# Patient Record
Sex: FEMALE | Race: OTHER | NOT HISPANIC OR LATINO | Employment: PART TIME | URBAN - NONMETROPOLITAN AREA
[De-identification: names, ages, dates, MRNs, and addresses within clinical notes are randomized per-mention and may not be internally consistent; named-entity substitution may affect disease eponyms.]

---

## 2018-06-22 ENCOUNTER — OFFICE VISIT (OUTPATIENT)
Dept: URGENT CARE | Facility: PHYSICIAN GROUP | Age: 18
End: 2018-06-22

## 2018-06-22 VITALS
WEIGHT: 116 LBS | HEART RATE: 73 BPM | BODY MASS INDEX: 17.58 KG/M2 | HEIGHT: 68 IN | RESPIRATION RATE: 16 BRPM | OXYGEN SATURATION: 99 % | DIASTOLIC BLOOD PRESSURE: 82 MMHG | TEMPERATURE: 99.1 F | SYSTOLIC BLOOD PRESSURE: 122 MMHG

## 2018-06-22 DIAGNOSIS — R11.0 NAUSEA: ICD-10-CM

## 2018-06-22 DIAGNOSIS — R35.0 URINARY FREQUENCY: ICD-10-CM

## 2018-06-22 DIAGNOSIS — R42 DIZZY: Primary | ICD-10-CM

## 2018-06-22 LAB
APPEARANCE UR: CLEAR
BILIRUB UR STRIP-MCNC: NORMAL MG/DL
COLOR UR AUTO: YELLOW
GLUCOSE BLD-MCNC: 89 MG/DL (ref 70–100)
GLUCOSE UR STRIP.AUTO-MCNC: NORMAL MG/DL
INT CON NEG: NEGATIVE
INT CON POS: POSITIVE
KETONES UR STRIP.AUTO-MCNC: NORMAL MG/DL
LEUKOCYTE ESTERASE UR QL STRIP.AUTO: NORMAL
NITRITE UR QL STRIP.AUTO: NORMAL
PH UR STRIP.AUTO: 6 [PH] (ref 5–8)
POC URINE PREGNANCY TEST: NORMAL
PROT UR QL STRIP: NORMAL MG/DL
RBC UR QL AUTO: NORMAL
SP GR UR STRIP.AUTO: 1.01
UROBILINOGEN UR STRIP-MCNC: NORMAL MG/DL

## 2018-06-22 PROCEDURE — 82962 GLUCOSE BLOOD TEST: CPT | Performed by: PHYSICIAN ASSISTANT

## 2018-06-22 PROCEDURE — 81025 URINE PREGNANCY TEST: CPT | Performed by: PHYSICIAN ASSISTANT

## 2018-06-22 PROCEDURE — 99214 OFFICE O/P EST MOD 30 MIN: CPT | Performed by: PHYSICIAN ASSISTANT

## 2018-06-22 PROCEDURE — 81002 URINALYSIS NONAUTO W/O SCOPE: CPT | Performed by: PHYSICIAN ASSISTANT

## 2018-06-22 NOTE — LETTER
June 22, 2018         Patient: Melva Garcia   YOB: 2000   Date of Visit: 6/22/2018           To Whom it May Concern:    Melva Garcia was seen in my clinic on 6/22/2018. She may return to work on 6/25/2018.    If you have any questions or concerns, please don't hesitate to call.        Sincerely,           Brittni Siu P.A.-C.  Electronically Signed

## 2018-06-23 ASSESSMENT — ENCOUNTER SYMPTOMS
VOMITING: 0
FOCAL WEAKNESS: 0
FEVER: 0
HEADACHES: 1
SENSORY CHANGE: 0
ABDOMINAL PAIN: 0
TINGLING: 0
VERTIGO: 0
DIZZINESS: 1
NAUSEA: 1

## 2018-06-24 NOTE — PROGRESS NOTES
"Subjective:      Melva Garcia is a 17 y.o. female who presents with Nausea (nausea, headache, disoriented x1week dizziness 2days )    PMH: Reviewed with patient/family member/EPIC.   MEDS:   Current Outpatient Prescriptions:   •  fluconazole (DIFLUCAN) 150 MG tablet, Take 1 Tab by mouth See Admin Instructions. One By mouth day 1, then repeat in 72 hours, Disp: 2 Tab, Rfl: 0  •  phenazopyridine (PYRIDIUM) 200 MG Tab, Take 1 Tab by mouth 3 times a day as needed., Disp: 6 Tab, Rfl: 0  •  Acetaminophen (TYLENOL CHILDRENS PO), Take  by mouth as needed., Disp: , Rfl:   ALLERGIES: No Known Allergies  SURGHX: History reviewed. No pertinent surgical history.  SOCHX:  reports that she has never smoked. She has never used smokeless tobacco. She reports that she uses drugs, including Marijuana. She reports that she does not drink alcohol.  FH: Reviewed with patient/family. Not pertinent to this complaint.          Patient presents with:  Nausea: nausea, headache, disoriented x1week dizziness 2days           Nausea   This is a new problem. The current episode started in the past 7 days. The problem occurs constantly. The problem has been waxing and waning. Associated symptoms include headaches and nausea. Pertinent negatives include no abdominal pain, fever, vertigo or vomiting. The symptoms are aggravated by exertion, standing, walking and bending. She has tried drinking, eating, lying down, position changes and rest for the symptoms. The treatment provided no relief.       Review of Systems   Constitutional: Positive for malaise/fatigue. Negative for fever.   Gastrointestinal: Positive for nausea. Negative for abdominal pain and vomiting.   Neurological: Positive for dizziness and headaches. Negative for vertigo, tingling, sensory change and focal weakness.   All other systems reviewed and are negative.         Objective:     /82   Pulse 73   Temp 37.3 °C (99.1 °F)   Resp 16   Ht 1.727 m (5' 8\")   Wt 52.6 kg (116 " lb)   SpO2 99%   BMI 17.64 kg/m²      Physical Exam   Constitutional: She is oriented to person, place, and time. She appears well-developed and well-nourished. She does not appear ill. No distress.   HENT:   Head: Normocephalic and atraumatic.   Right Ear: Tympanic membrane normal.   Left Ear: Tympanic membrane normal.   Nose: Nose normal.   Mouth/Throat: Uvula is midline and oropharynx is clear and moist.   Eyes: Conjunctivae and EOM are normal. Pupils are equal, round, and reactive to light.   Neck: Normal range of motion. Neck supple.   Cardiovascular: Normal rate, regular rhythm and normal heart sounds.    Pulmonary/Chest: Effort normal and breath sounds normal.   Abdominal: Soft.   Musculoskeletal: Normal range of motion.   Neurological: She is alert and oriented to person, place, and time. Gait normal.   Skin: Skin is warm and dry. Capillary refill takes less than 2 seconds.   Psychiatric: She has a normal mood and affect.   Nursing note and vitals reviewed.         UA: neg dip  Preg: neg  Glucose: 89     Assessment/Plan:     1. Dizzy  POCT Urinalysis    POCT Pregnancy    POCT Glucose   2. Nausea  POCT Urinalysis    POCT Pregnancy    POCT Glucose   3. Urinary frequency  POCT Urinalysis    POCT Pregnancy    POCT Glucose     PT requires evaluation and treatment at a facility that can provide a higher level of care due to acuity of illness/complaint.     PT friend will drive her to the ER for a more in depth work up than can be accomplished in this uc.

## 2019-04-13 ENCOUNTER — HOSPITAL ENCOUNTER (OUTPATIENT)
Facility: MEDICAL CENTER | Age: 19
End: 2019-04-13
Attending: PHYSICIAN ASSISTANT
Payer: COMMERCIAL

## 2019-04-13 ENCOUNTER — OFFICE VISIT (OUTPATIENT)
Dept: URGENT CARE | Facility: PHYSICIAN GROUP | Age: 19
End: 2019-04-13
Payer: COMMERCIAL

## 2019-04-13 VITALS
RESPIRATION RATE: 14 BRPM | SYSTOLIC BLOOD PRESSURE: 120 MMHG | BODY MASS INDEX: 17.58 KG/M2 | WEIGHT: 116 LBS | HEART RATE: 104 BPM | OXYGEN SATURATION: 99 % | DIASTOLIC BLOOD PRESSURE: 84 MMHG | TEMPERATURE: 98 F | HEIGHT: 68 IN

## 2019-04-13 DIAGNOSIS — R31.9 HEMATURIA, UNSPECIFIED TYPE: ICD-10-CM

## 2019-04-13 DIAGNOSIS — N12 PYELONEPHRITIS: ICD-10-CM

## 2019-04-13 LAB
APPEARANCE UR: NORMAL
BILIRUB UR STRIP-MCNC: NORMAL MG/DL
COLOR UR AUTO: NORMAL
GLUCOSE UR STRIP.AUTO-MCNC: NORMAL MG/DL
KETONES UR STRIP.AUTO-MCNC: 15 MG/DL
LEUKOCYTE ESTERASE UR QL STRIP.AUTO: NORMAL
NITRITE UR QL STRIP.AUTO: POSITIVE
PH UR STRIP.AUTO: 7 [PH] (ref 5–8)
PROT UR QL STRIP: 300 MG/DL
RBC UR QL AUTO: NORMAL
SP GR UR STRIP.AUTO: 1.02
UROBILINOGEN UR STRIP-MCNC: 1 MG/DL

## 2019-04-13 PROCEDURE — 87186 SC STD MICRODIL/AGAR DIL: CPT

## 2019-04-13 PROCEDURE — 99214 OFFICE O/P EST MOD 30 MIN: CPT | Performed by: PHYSICIAN ASSISTANT

## 2019-04-13 PROCEDURE — 87077 CULTURE AEROBIC IDENTIFY: CPT

## 2019-04-13 PROCEDURE — 87086 URINE CULTURE/COLONY COUNT: CPT

## 2019-04-13 PROCEDURE — 81002 URINALYSIS NONAUTO W/O SCOPE: CPT | Performed by: PHYSICIAN ASSISTANT

## 2019-04-13 RX ORDER — CIPROFLOXACIN 500 MG/1
500 TABLET, FILM COATED ORAL EVERY 12 HOURS
Qty: 14 TAB | Refills: 0 | Status: SHIPPED | OUTPATIENT
Start: 2019-04-13 | End: 2019-04-20

## 2019-04-13 NOTE — PROGRESS NOTES
Chief Complaint   Patient presents with   • Blood in Urine   • Nausea       HISTORY OF PRESENT ILLNESS: Patient is a 18 y.o. female who presents today because she has a 2-3-day history of blood in her urine, lower abdominal discomfort, nausea, mild bilateral flank pain.  Denies any fevers, vomiting or diarrhea.  She has not been taking any medications for symptoms, has only minimal if any pain with urination    There are no active problems to display for this patient.      Allergies:Pcn [penicillins]    Current Outpatient Prescriptions Ordered in [facility]   Medication Sig Dispense Refill   • ciprofloxacin (CIPRO) 500 MG Tab Take 1 Tab by mouth every 12 hours for 7 days. 14 Tab 0   • fluconazole (DIFLUCAN) 150 MG tablet Take 1 Tab by mouth See Admin Instructions. One By mouth day 1, then repeat in 72 hours (Patient not taking: Reported on 4/13/2019) 2 Tab 0   • phenazopyridine (PYRIDIUM) 200 MG Tab Take 1 Tab by mouth 3 times a day as needed. (Patient not taking: Reported on 4/13/2019) 6 Tab 0   • Acetaminophen (TYLENOL CHILDRENS PO) Take  by mouth as needed.       No current Epic-ordered facility-administered medications on file.        No past medical history on file.    Social History   Substance Use Topics   • Smoking status: Never Smoker   • Smokeless tobacco: Never Used   • Alcohol use No       No family status information on file.   No family history on file.    ROS:  Review of Systems   Constitutional: Negative for fever, chills, weight loss and malaise/fatigue.   HENT: Negative for ear pain, nosebleeds, congestion, sore throat and neck pain.    Eyes: Negative for blurred vision.   Respiratory: Negative for cough, sputum production, shortness of breath and wheezing.    Cardiovascular: Negative for chest pain, palpitations, orthopnea and leg swelling.   Gastrointestinal: Negative for heartburn, positive for nausea, no vomiting and positive for bilateral flank and mild lower abdominal pain.   Genitourinary:  "Positive for mild dysuria, urgency and frequency.     Exam:  /84 (BP Location: Left arm, Patient Position: Sitting, BP Cuff Size: Small adult)   Pulse (!) 104   Temp 36.7 °C (98 °F) (Temporal)   Resp 14   Ht 1.727 m (5' 8\")   Wt 52.6 kg (116 lb)   SpO2 99%   General:  Well nourished, well developed female in NAD  Head:Normocephalic, atraumatic  Eyes: PERRLA, EOM within normal limits, no conjunctival injection, no scleral icterus, visual fields and acuity grossly intact.  Extremities: no clubbing, cyanosis, or edema.    Urinalysis in the office shows cloudy bloody urine with moderate amount of bilirubin, ketones, large amount of blood, protein, nitrites and leukocyte esterase.    Please note that this dictation was created using voice recognition software. I have made every reasonable attempt to correct obvious errors, but I expect that there are errors of grammar and possibly content that I did not discover before finalizing the note.    Assessment/Plan:  1. Hematuria, unspecified type  POCT Urinalysis   2. Pyelonephritis  Urine Culture    ciprofloxacin (CIPRO) 500 MG Tab   Increase p.o. fluids.    Followup with primary care in the next 7-10 days if not significantly improving, return to the urgent care or go to the emergency room sooner for any worsening of symptoms.       "

## 2019-04-15 DIAGNOSIS — N12 PYELONEPHRITIS: ICD-10-CM

## 2019-04-17 LAB
BACTERIA UR CULT: ABNORMAL
BACTERIA UR CULT: ABNORMAL
SIGNIFICANT IND 70042: ABNORMAL
SITE SITE: ABNORMAL
SOURCE SOURCE: ABNORMAL

## 2019-04-19 ENCOUNTER — TELEPHONE (OUTPATIENT)
Dept: MEDICAL GROUP | Facility: PHYSICIAN GROUP | Age: 19
End: 2019-04-19

## 2019-04-19 NOTE — TELEPHONE ENCOUNTER
----- Message from Jhoan Lund P.A.-C. sent at 4/17/2019  9:43 AM PDT -----  Please notify the patient that the urine culture was positive for bacterial infection.  It is susceptible to the medication I prescribed.  Finish the antibiotic and follow up with PCP if symptoms persist.

## 2020-07-23 ENCOUNTER — OFFICE VISIT (OUTPATIENT)
Dept: URGENT CARE | Facility: CLINIC | Age: 20
End: 2020-07-23
Payer: COMMERCIAL

## 2020-07-23 ENCOUNTER — HOSPITAL ENCOUNTER (OUTPATIENT)
Facility: MEDICAL CENTER | Age: 20
End: 2020-07-23
Attending: PHYSICIAN ASSISTANT
Payer: COMMERCIAL

## 2020-07-23 VITALS
HEIGHT: 68 IN | RESPIRATION RATE: 18 BRPM | HEART RATE: 63 BPM | TEMPERATURE: 98 F | DIASTOLIC BLOOD PRESSURE: 70 MMHG | SYSTOLIC BLOOD PRESSURE: 114 MMHG | OXYGEN SATURATION: 93 % | BODY MASS INDEX: 18.64 KG/M2 | WEIGHT: 123 LBS

## 2020-07-23 DIAGNOSIS — N90.89 LABIAL LESION: ICD-10-CM

## 2020-07-23 DIAGNOSIS — Z11.3 SCREENING FOR STD (SEXUALLY TRANSMITTED DISEASE): ICD-10-CM

## 2020-07-23 PROCEDURE — 87491 CHLMYD TRACH DNA AMP PROBE: CPT

## 2020-07-23 PROCEDURE — 87529 HSV DNA AMP PROBE: CPT

## 2020-07-23 PROCEDURE — 99214 OFFICE O/P EST MOD 30 MIN: CPT | Performed by: PHYSICIAN ASSISTANT

## 2020-07-23 PROCEDURE — 87591 N.GONORRHOEAE DNA AMP PROB: CPT

## 2020-07-23 RX ORDER — SULFAMETHOXAZOLE AND TRIMETHOPRIM 800; 160 MG/1; MG/1
1 TABLET ORAL 2 TIMES DAILY
Qty: 14 TAB | Refills: 0 | Status: SHIPPED | OUTPATIENT
Start: 2020-07-23 | End: 2020-07-30

## 2020-07-23 ASSESSMENT — ENCOUNTER SYMPTOMS
HEADACHES: 0
VAGINITIS: 1
EYE DISCHARGE: 0
NAUSEA: 0
VOMITING: 0
SHORTNESS OF BREATH: 0
ABDOMINAL PAIN: 0
FEVER: 0
EYE REDNESS: 0
SORE THROAT: 0
COUGH: 0

## 2020-07-23 NOTE — PROGRESS NOTES
Subjective:      Melva Garcia is a 20 y.o. female who presents with Vaginal Pain (SORE ON LABIA X 5 DAYS , VERY PAINFUL AND UNCOMFORTABLE )        Vaginitis   The patient's primary symptoms include genital lesions. This is a new problem. Episode onset: x 5 days ago. The problem occurs constantly. The problem has been unchanged. The pain is moderate. The problem affects the right side. Pertinent negatives include no abdominal pain, dysuria, fever, frequency, headaches, hematuria, joint pain, nausea, rash, sore throat, urgency or vomiting. The vaginal discharge was normal. There has been no bleeding. The symptoms are aggravated by tactile pressure (and walking). Treatments tried: The patient has applied warm compresses to the affected area.  She has also applied Neosporin to the lesion. The treatment provided mild relief.     The patient presents to clinic complaining of a bump to her right labia x5 days.  The patient describes the bump as painful.  She reports associated irritation with palpation and walkikng.  The patient states she has been trying home remedies, which includes warm compresses and Neosporin.  The patient states the bump to her right labia has gradually decreased in size.  The patient notes possible discharge/drainage from the bump.  The patient reports no associated fever.  No abdominal pain.  No pelvic pain.  No abnormal vaginal discharge.  No abnormal vaginal bleeding.  No urinary symptoms.  No urinary frequency.  No urinary urgency.  No hematuria.  The patient has taken ibuprofen for her current symptoms.  The patient states she is not concerned about sexually transmitted diseases at this time, and states her partner has no similar symptoms.    The patient's last menstrual period was 7/1/2020.    PMH:  has no past medical history on file.  MEDS:   Current Outpatient Medications:   •  fluconazole (DIFLUCAN) 150 MG tablet, Take 1 Tab by mouth See Admin Instructions. One By mouth day 1, then repeat  "in 72 hours (Patient not taking: Reported on 4/13/2019), Disp: 2 Tab, Rfl: 0  •  phenazopyridine (PYRIDIUM) 200 MG Tab, Take 1 Tab by mouth 3 times a day as needed. (Patient not taking: Reported on 4/13/2019), Disp: 6 Tab, Rfl: 0  •  Acetaminophen (TYLENOL CHILDRENS PO), Take  by mouth as needed., Disp: , Rfl:   ALLERGIES:   Allergies   Allergen Reactions   • Pcn [Penicillins]      SURGHX: History reviewed. No pertinent surgical history.  SOCHX:  reports that she has never smoked. She has never used smokeless tobacco. She reports current drug use. Drug: Marijuana. She reports that she does not drink alcohol.  FH: Family history was reviewed, no pertinent findings to report    Review of Systems   Constitutional: Negative for fever.   HENT: Negative for congestion, ear pain and sore throat.    Eyes: Negative for discharge and redness.   Respiratory: Negative for cough and shortness of breath.    Cardiovascular: Negative for chest pain and leg swelling.   Gastrointestinal: Negative for abdominal pain, nausea and vomiting.   Genitourinary: Negative for dysuria, frequency, hematuria and urgency.   Musculoskeletal: Negative for joint pain.   Skin: Negative for rash.   Neurological: Negative for headaches.          Objective:     /70 (BP Location: Left arm, Patient Position: Sitting, BP Cuff Size: Adult)   Pulse 63   Temp 36.7 °C (98 °F) (Temporal)   Resp 18   Ht 1.727 m (5' 8\")   Wt 55.8 kg (123 lb)   SpO2 93%   BMI 18.70 kg/m²      Physical Exam  Constitutional:       General: She is not in acute distress.     Appearance: Normal appearance. She is not ill-appearing.   HENT:      Head: Normocephalic.      Right Ear: External ear normal.      Left Ear: External ear normal.      Nose: Nose normal.   Eyes:      Extraocular Movements: Extraocular movements intact.      Conjunctiva/sclera: Conjunctivae normal.   Neck:      Musculoskeletal: Normal range of motion and neck supple.   Cardiovascular:      Rate and " Rhythm: Normal rate.   Pulmonary:      Effort: Pulmonary effort is normal.   Genitourinary:     Labia:         Right: Lesion present.           Comments:   Right Labia:  Open ulcer-like lesion to the right labia with slight tenderness to palpation.  No swelling.  No surrounding erythema.  No increased warmth.  No active discharge/drainage.  No vesicles.  No pustules.  No palpable induration.  No palpable fluctuance.  No additional labial lesions.   Musculoskeletal: Normal range of motion.   Skin:     General: Skin is warm and dry.   Neurological:      Mental Status: She is alert and oriented to person, place, and time.            Progress:  HSV PCR - pending     Syphilis - pending    Chlamydia/Gonorrhea - pending      Assessment/Plan:     1. Labial lesion  - T.PALLIDUM AB EIA; Future  - HSV 1/2 By PCR(Herpes)+[id number]; Future  - sulfamethoxazole-trimethoprim (BACTRIM DS) 800-160 MG tablet; Take 1 Tab by mouth 2 times a day for 7 days.  Dispense: 14 Tab; Refill: 0    2. Screening for STD (sexually transmitted disease)  - CHLAMYDIA/GC PCR URINE OR SWAB; Future    The patient's presenting symptoms and physical exam findings are consistent with a labial lesion to the right labia.  On physical exam, the patient had an open ulcer-like lesion to the right labia with slight tenderness to palpation.  No swelling, surrounding erythema, increased warmth, active discharge/drainage, vesicles, or pustules were appreciated.  No palpable induration or palpable fluctuance was noted.  No additional labial lesions were present.  Based on the patient's presenting symptoms and physical exam findings, I believe the patient's symptoms are due to a localized labial abscess, which spontaneously drained.  Will prescribe the patient Bactrim for her current symptoms.  Advised the patient to continue warm compresses for symptomatic management.  Additionally, will test the patient for HSV and Syphilis to rule out possible secondary causes of her  current symptoms.  Based on the patient's physical exam findings, it is unlikely her symptoms are due to herpes and/or syphilis.  The patient states she would also like to be tested for chlamydia and gonorrhea at this time given she is being tested for herpes and syphilis.  Will call the patient with the results of her STD testing, and will treat accordingly.  Recommend OTC medications and supportive care for symptomatic management.  Recommend patient follow-up with her PCP and/or OB/GYN.  Discussed return precautions with the patient, and she verbalized understanding.    Differential diagnoses, supportive care, and indications for immediate follow-up discussed with patient.   Instructed to return to clinic or nearest emergency department for any change in condition, further concerns, or worsening of symptoms.    OTC Tylenol or Motrin for fever/discomfort.  Apply warm compresses to the affected area for symptomatic relief  Warm sits baths  Monitor for worsening signs of infection  Follow-up with PCP and/or OB/GYN   Work note provided  Return to clinic or go to the ED if symptoms worsen or fail to improve, or if patient should develop worsening/increasing/persistent labial lesion(s), pain/tenderness to the affected area, swelling, increased redness or warmth, discharge/drainage, urinary symptoms, abnormal vaginal discharge, abnormal vaginal bleeding, abdominal pain, pelvic pain, fever/chills, secondary signs of infection, and/or any concerning symptoms.    Discussed plan with the patient, and she agrees to the above.     Please note that this dictation was created using voice recognition software. I have made every reasonable attempt to correct obvious errors, but I expect that there may be errors of grammar and possibly content that I did not discover before finalizing the note.

## 2020-07-23 NOTE — LETTER
July 23, 2020         Patient: Melva Garcia   YOB: 2000   Date of Visit: 7/23/2020           To Whom it May Concern:    Melva Garcia was seen in my clinic on 7/23/2020. She may return to work on Sunday July 26, 2020.    If you have any questions or concerns, please don't hesitate to call.        Sincerely,           Vanessa Oliveira P.A.-C.  Electronically Signed

## 2020-07-24 LAB
C TRACH DNA SPEC QL NAA+PROBE: NEGATIVE
HSV1 DNA SPEC QL NAA+PROBE: NEGATIVE
HSV2 DNA SPEC QL NAA+PROBE: POSITIVE
N GONORRHOEA DNA SPEC QL NAA+PROBE: NEGATIVE
SPECIMEN SOURCE: ABNORMAL
SPECIMEN SOURCE: NORMAL

## 2020-07-25 ENCOUNTER — TELEPHONE (OUTPATIENT)
Dept: URGENT CARE | Facility: PHYSICIAN GROUP | Age: 20
End: 2020-07-25

## 2020-07-25 NOTE — TELEPHONE ENCOUNTER
7/25 @ 10:44AM    Attempted to call the patient with her test results. The patient's chlamydia and gonorrhea testing was negative. The patient's herpes PCR swab was positive for HSV 2. Advised the patient to return my call at [phone]. Will also try to call the patient again later.

## 2020-07-27 NOTE — TELEPHONE ENCOUNTER
7/27 @ 2:01PM    Spoke the patient regarding her STD testing. Informed the patient her chlamydia and gonorrhea testing was NEGATIVE. Advised the patient the Herpes PCR swab was POSITIVE for HSV Type 2. Given the duration since the patient's initial onset of symptoms, antiviral medications are not indicated at this time. The patient states she had not had any additional outbreaks. Advised the patient to follow-up with Primary Care and/or Gynecology. Recommend the patient return to clinic for any worsening or concerning symptoms. The patient had no further questions at this time.

## 2020-07-30 ENCOUNTER — TELEPHONE (OUTPATIENT)
Dept: SCHEDULING | Facility: IMAGING CENTER | Age: 20
End: 2020-07-30

## 2020-07-31 ENCOUNTER — OFFICE VISIT (OUTPATIENT)
Dept: MEDICAL GROUP | Facility: MEDICAL CENTER | Age: 20
End: 2020-07-31
Payer: COMMERCIAL

## 2020-07-31 VITALS
HEIGHT: 68 IN | RESPIRATION RATE: 16 BRPM | SYSTOLIC BLOOD PRESSURE: 114 MMHG | TEMPERATURE: 98.9 F | DIASTOLIC BLOOD PRESSURE: 74 MMHG | OXYGEN SATURATION: 98 % | BODY MASS INDEX: 18.41 KG/M2 | WEIGHT: 121.47 LBS | HEART RATE: 78 BPM

## 2020-07-31 DIAGNOSIS — A60.00 GENITAL HERPES SIMPLEX, UNSPECIFIED SITE: ICD-10-CM

## 2020-07-31 DIAGNOSIS — Z13.1 DIABETES MELLITUS SCREENING: ICD-10-CM

## 2020-07-31 DIAGNOSIS — N76.0 LABIAL INFECTION: ICD-10-CM

## 2020-07-31 DIAGNOSIS — Z11.3 SCREEN FOR STD (SEXUALLY TRANSMITTED DISEASE): ICD-10-CM

## 2020-07-31 DIAGNOSIS — Z00.00 ENCOUNTER FOR PREVENTIVE CARE: ICD-10-CM

## 2020-07-31 DIAGNOSIS — Z13.6 SCREENING FOR CARDIOVASCULAR CONDITION: ICD-10-CM

## 2020-07-31 PROCEDURE — 99204 OFFICE O/P NEW MOD 45 MIN: CPT | Performed by: INTERNAL MEDICINE

## 2020-07-31 RX ORDER — VALACYCLOVIR HYDROCHLORIDE 1 G/1
1000 TABLET, FILM COATED ORAL 2 TIMES DAILY
Qty: 14 TAB | Refills: 0 | Status: SHIPPED | OUTPATIENT
Start: 2020-07-31 | End: 2020-08-07

## 2020-07-31 RX ORDER — SULFAMETHOXAZOLE AND TRIMETHOPRIM 800; 160 MG/1; MG/1
1 TABLET ORAL 2 TIMES DAILY
Qty: 6 TAB | Refills: 0 | Status: SHIPPED | OUTPATIENT
Start: 2020-07-31 | End: 2020-08-03

## 2020-07-31 ASSESSMENT — ANXIETY QUESTIONNAIRES
4. TROUBLE RELAXING: SEVERAL DAYS
6. BECOMING EASILY ANNOYED OR IRRITABLE: NOT AT ALL
GAD7 TOTAL SCORE: 4
7. FEELING AFRAID AS IF SOMETHING AWFUL MIGHT HAPPEN: SEVERAL DAYS
2. NOT BEING ABLE TO STOP OR CONTROL WORRYING: NOT AT ALL
1. FEELING NERVOUS, ANXIOUS, OR ON EDGE: SEVERAL DAYS
5. BEING SO RESTLESS THAT IT IS HARD TO SIT STILL: NOT AT ALL
3. WORRYING TOO MUCH ABOUT DIFFERENT THINGS: SEVERAL DAYS

## 2020-07-31 ASSESSMENT — PATIENT HEALTH QUESTIONNAIRE - PHQ9
5. POOR APPETITE OR OVEREATING: 1 - SEVERAL DAYS
SUM OF ALL RESPONSES TO PHQ QUESTIONS 1-9: 7
CLINICAL INTERPRETATION OF PHQ2 SCORE: 2

## 2020-07-31 NOTE — LETTER
[facility]  [address]  [facility] [address]     July 31, 2020    Patient: Melva Garcia   YOB: 2000   Date of Visit: 7/31/2020       To Whom It May Concern:    Melva Garcia was seen and treated in our department on 7/31/2020. Please excuse her from work from 7/29/2020 to 8/1/2020 due to her medical conditions. Please allow her to return to work with no restriction on 8/6/2020.      Sincerely,       Teresita Erickson M.D.   [phone]

## 2020-07-31 NOTE — PROGRESS NOTES
"New Patient to Establish    Reason to establish: New patient to establish    Melva Garcia is a 20 y.o. female who presents today with the following:    CC:   Chief Complaint   Patient presents with   • Establish Care   • Medication Refill     for HSV-2       HPI:     Genital herpes simplex  Labial painful blister     Ref. Range 7/23/2020 12:08   HSV Type 2 Latest Ref Range: Negative  POSITIVE (A)   HSV Type I Latest Ref Range: Negative  Negative   Trial of valacyclovir for 7 days      Labial infection  Right labial infection  Improving with bactrim DS, continue for 3 more days          Current Outpatient Medications:   •  valacyclovir (VALTREX) 1 GM Tab, Take 1 Tab by mouth 2 times a day for 7 days., Disp: 14 Tab, Rfl: 0  •  sulfamethoxazole-trimethoprim (BACTRIM DS) 800-160 MG tablet, Take 1 Tab by mouth 2 times a day for 3 days., Disp: 6 Tab, Rfl: 0  •  Acetaminophen (TYLENOL CHILDRENS PO), Take  by mouth as needed., Disp: , Rfl:     Allergies, past medical history, past surgical history, medications, family history, social history reviewed and updated.    ROS     Constitutional: Denies fevers or chills  Eyes: Denies changes in vision  Ears/Nose/Throat/Mouth: Denies nasal congestion or sore throat   Cardiovascular: Denies chest pain or palpitations   Respiratory: Denies shortness of breath , Denies cough  Gastrointestinal/Hepatic: Denies abd pain, nausea, vomiting   Genitourinary: Denies dysuria or frequency  Musculoskeletal/Rheum: Denies joint pain and swelling   Neurological: Denies headache  Psychiatric: Denies mood disorder   Endocrine: Denies hx of diabetes or thyroid dysfunction  Heme/Oncology/Lymph Nodes: Denies weight changes or enlarged LNs.    Physical Exam  /74 (BP Location: Left arm, Patient Position: Sitting, BP Cuff Size: Adult)   Pulse 78   Temp 37.2 °C (98.9 °F) (Temporal)   Resp 16   Ht 1.727 m (5' 8\")   Wt 55.1 kg (121 lb 7.6 oz)   SpO2 98%   BMI 18.47 kg/m²   General: Normal " appearance.  Well developed, well nourished, no acute distress.  HEENT: Normocephalic.  Extraocular motion intact. Pupils are equally round, reactive to light and accommodation, conjunctiva clear, no scleral icterus.  Ears: normal shape and contour, ear canals clear, tympanic membranes intact. Hearing intact.  Oropharynx clear, no erythema, edema or exudate noted.  NECK: Thyroid is not enlarged. No JVD.  No carotid bruits. No masses.  Cardiovascular: Regular rhythm and rate. No murmur/rubs/gallops.   Respiratory: Normal respiratory effort, clear to auscultation bilaterally. No wheezing/rales/rhonchi.    Abdomen: Bowel sounds present, soft, nontender, nondistended, no rebound, no guarding. No hepatosplenomegaly.  : No suprapubic tenderness. No CVA tenderness.   EXT: no LE edema b/l. No cyanosis.  No clubbing.  Lymph: No cervical, supraclavicular or axillary lymph nodes are palpable  Skin: Warm and dry.  No suspicious lesions or rashes.   Neurologic: No focal deficits.  Cranial nerves II through XII grossly intact.  Sensation intact.  Deep tendon reflexes 2+  Psych: AAOx3,  Normal mood and affect, normal judgment and insight, memory within normal limits  : no suprapubic pain, tenderness of right labial region. No regional LN      Assessment and Plan    1. Genital herpes simplex, unspecified site  - valacyclovir (VALTREX) 1 GM Tab; Take 1 Tab by mouth 2 times a day for 7 days.  Dispense: 14 Tab; Refill: 0    2. Labial infection  - sulfamethoxazole-trimethoprim (BACTRIM DS) 800-160 MG tablet; Take 1 Tab by mouth 2 times a day for 3 days.  Dispense: 6 Tab; Refill: 0    3. Encounter for preventive care  - Lipid Profile; Future  - CBC WITH DIFFERENTIAL; Future  - Comp Metabolic Panel; Future  - TSH WITH REFLEX TO FT4; Future    4. Diabetes mellitus screening  - Comp Metabolic Panel; Future    5. Screening for cardiovascular condition  - Lipid Profile; Future    6. Screen for STD (sexually transmitted disease)  -  T.PALLIDUM AB EIA; Future  - HIV AG/AB COMBO ASSAY SCREENING; Future  - HEP C VIRUS ANTIBODY; Future  - HEP B SURFACE ANTIGEN; Future  - HEP B SURFACE AB; Future  - Chlamydia/GC PCR Urine Or Swab; Future        Follow-up:Return if symptoms worsen or fail to improve.    This note was created using voice recognition software. There may be unintended errors in spelling, grammar or content.

## 2020-07-31 NOTE — ASSESSMENT & PLAN NOTE
Labial painful blister     Ref. Range 7/23/2020 12:08   HSV Type 2 Latest Ref Range: Negative  POSITIVE (A)   HSV Type I Latest Ref Range: Negative  Negative   Trial of valacyclovir for 7 days

## 2020-09-15 ENCOUNTER — OFFICE VISIT (OUTPATIENT)
Dept: MEDICAL GROUP | Facility: MEDICAL CENTER | Age: 20
End: 2020-09-15
Payer: COMMERCIAL

## 2020-09-15 VITALS
HEART RATE: 81 BPM | RESPIRATION RATE: 16 BRPM | OXYGEN SATURATION: 100 % | TEMPERATURE: 98.6 F | WEIGHT: 126.76 LBS | DIASTOLIC BLOOD PRESSURE: 56 MMHG | BODY MASS INDEX: 19.21 KG/M2 | SYSTOLIC BLOOD PRESSURE: 106 MMHG | HEIGHT: 68 IN

## 2020-09-15 DIAGNOSIS — A60.00 GENITAL HERPES SIMPLEX, UNSPECIFIED SITE: ICD-10-CM

## 2020-09-15 DIAGNOSIS — N76.0 LABIAL INFECTION: ICD-10-CM

## 2020-09-15 PROCEDURE — 99214 OFFICE O/P EST MOD 30 MIN: CPT | Performed by: INTERNAL MEDICINE

## 2020-09-15 NOTE — LETTER
[facility]  [address]  [facility] [address]     September 15, 2020    Patient: Melva Garcia   YOB: 2000   Date of Visit: 9/15/2020       To Whom It May Concern:    Melva Garcia was seen and treated in our department on 9/15/2020. Patient is okay to return to work on 8/26/2020 with no restrictions.       Sincerely,       Teresita Erickson M.D.

## 2020-09-16 NOTE — PROGRESS NOTES
"Established Patient    Melva Garcia is a 20 y.o. female who presents today with the following:    CC:   Chief Complaint   Patient presents with   • Paperwork       HPI: She is here do do the [facility] paperwork.  Her labial infection and genital herpes simplex has resolved with valtrex and Bactrim DS.  She is following with OBGYN Dr. Josette Ortega    Current Outpatient Medications   Medication Sig Dispense Refill   • Acetaminophen (TYLENOL CHILDRENS PO) Take  by mouth as needed.       No current facility-administered medications for this visit.        Allergies, past medical history, past surgical history, medications, family history, social history reviewed and updated.    ROS   Constitutional: Denies fevers or chills  Eyes: Denies changes in vision  Ears/Nose/Throat/Mouth: Denies nasal congestion or sore throat   Cardiovascular: Denies chest pain or palpitations   Respiratory: Denies shortness of breath , Denies cough  Gastrointestinal/Hepatic: Denies abd pain, nausea, vomiting   Genitourinary: Denies dysuria or frequency  Musculoskeletal/Rheum: Denies joint pain and swelling   Neurological: Denies headache  Psychiatric: Denies mood disorder   Endocrine: Denies hx of diabetes or thyroid dysfunction  Heme/Oncology/Lymph Nodes: Denies weight changes or enlarged LNs.    Physical Exam  Vitals: /56 (BP Location: Left arm, Patient Position: Sitting, BP Cuff Size: Adult)   Pulse 81   Temp 37 °C (98.6 °F) (Temporal)   Resp 16   Ht 1.727 m (5' 8\")   Wt 57.5 kg (126 lb 12.2 oz)   SpO2 100%   BMI 19.27 kg/m²   General: Alert, pleasant, NAD  HEENT: Normocephalic.  EOMI, no icterus or pallor.  Conjunctivae and lids normal. External ears normal. Oropharynx non-erythematous, mucous membranes moist.  Neck supple.  No thyromegaly or masses palpated.   Lymph: No cervical or supraclavicular lymphadenopathy.  Cardiovascular: Regular rate and rhythm.  S1 and S2 normal.  No murmurs appreciated.  Respiratory: Normal respiratory " effort.  Clear to auscultation bilaterally.  Abdomen: Non-distended, soft  Skin: Warm, dry, no rashes.  Musculoskeletal: Gait is normal.  Moves all extremities well.  Extremities: No leg edema.     Psych:  Affect/mood is normal, judgement is good, memory is intact, grooming is appropriate.        Assessment and Plan    1. Genital herpes simplex, unspecified site  In remission  Following with OBGYN    2. Labial infection  Resolved    FMLA paperwork filled out scanned and faxed. Original copy given to patient    Patient was seen for 25 minutes face to face of which > 50% of appointment time was spent on discussing and filling out paperwork, counseling and coordination of care regarding the above.      Follow-up:Return if symptoms worsen or fail to improve.    This note was created using voice recognition software. There may be unintended errors in spelling, grammar or content.

## 2021-01-24 ENCOUNTER — OFFICE VISIT (OUTPATIENT)
Dept: URGENT CARE | Facility: CLINIC | Age: 21
End: 2021-01-24
Payer: COMMERCIAL

## 2021-01-24 VITALS
TEMPERATURE: 97.1 F | WEIGHT: 115 LBS | SYSTOLIC BLOOD PRESSURE: 120 MMHG | BODY MASS INDEX: 17.03 KG/M2 | DIASTOLIC BLOOD PRESSURE: 80 MMHG | RESPIRATION RATE: 16 BRPM | HEIGHT: 69 IN | HEART RATE: 66 BPM | OXYGEN SATURATION: 99 %

## 2021-01-24 DIAGNOSIS — L28.2 PRURITIC RASH: ICD-10-CM

## 2021-01-24 PROCEDURE — 99213 OFFICE O/P EST LOW 20 MIN: CPT | Performed by: FAMILY MEDICINE

## 2021-01-24 RX ORDER — CLOTRIMAZOLE AND BETAMETHASONE DIPROPIONATE 10; .64 MG/G; MG/G
1 CREAM TOPICAL 2 TIMES DAILY
Qty: 45 G | Refills: 1 | Status: SHIPPED | OUTPATIENT
Start: 2021-01-24 | End: 2021-04-06

## 2021-01-24 RX ORDER — VALACYCLOVIR HYDROCHLORIDE 500 MG/1
500 TABLET, FILM COATED ORAL 2 TIMES DAILY
COMMUNITY
End: 2022-01-05

## 2021-01-24 NOTE — PATIENT INSTRUCTIONS
Follow up if not significantly improved as expected in 10-14 days, sooner if any worsening or new symptoms

## 2021-01-24 NOTE — PROGRESS NOTES
"Subjective:      Melva Garcia is a 20 y.o. female who presents with Wound Infection (x 2 months, sore on Rt. side of hip, little redness, discharge off / on and itching)            This is a new problem.  20-year-old presenting for evaluation of a pruritic rash on the right side of the abdomen for the past couple of months.  It started smaller and is gotten bigger.  No other rashes reported.  She has noticed some itchy scalp as well recently.  Review of system otherwise negative      Review of Systems   All other systems reviewed and are negative.         Objective:     /80 (BP Location: Left arm, Patient Position: Sitting, BP Cuff Size: Adult)   Pulse 66   Temp 36.2 °C (97.1 °F) (Temporal)   Resp 16   Ht 1.753 m (5' 9\")   Wt 52.2 kg (115 lb)   SpO2 99%   BMI 16.98 kg/m²      Physical Exam  Constitutional:       General: She is not in acute distress.     Appearance: She is not ill-appearing, toxic-appearing or diaphoretic.   HENT:      Head: Normocephalic and atraumatic.      Comments: No rashes or lesions noted on the scalp  Eyes:      Conjunctiva/sclera: Conjunctivae normal.   Cardiovascular:      Rate and Rhythm: Normal rate.   Pulmonary:      Effort: Pulmonary effort is normal. No respiratory distress.      Breath sounds: No stridor.   Skin:     Coloration: Skin is not jaundiced or pale.      Findings: Rash present.             Comments: Overall erythematous scaly rash noted about 2 x4 centimeter in size.  No blisters or pustules.   Neurological:      Mental Status: She is oriented to person, place, and time.   Psychiatric:         Thought Content: Thought content normal.                 Assessment/Plan:        1. Pruritic rash  - clotrimazole-betamethasone (LOTRISONE) 1-0.05 % Cream; Apply 1 Application topically 2 times a day.  Dispense: 45 g; Refill: 1      Likely fungal, ringworm most likely  Only localized to 1 area.  Plan per orders and instructions  Warning signs reviewed  Follow up if not " significantly improved as expected in 10-14 days, sooner if any worsening or new symptoms

## 2021-04-06 ENCOUNTER — HOSPITAL ENCOUNTER (OUTPATIENT)
Dept: LAB | Facility: MEDICAL CENTER | Age: 21
End: 2021-04-06
Attending: INTERNAL MEDICINE
Payer: COMMERCIAL

## 2021-04-06 ENCOUNTER — OFFICE VISIT (OUTPATIENT)
Dept: URGENT CARE | Facility: CLINIC | Age: 21
End: 2021-04-06
Payer: COMMERCIAL

## 2021-04-06 VITALS
DIASTOLIC BLOOD PRESSURE: 88 MMHG | WEIGHT: 123 LBS | HEIGHT: 69 IN | TEMPERATURE: 98.5 F | HEART RATE: 78 BPM | SYSTOLIC BLOOD PRESSURE: 130 MMHG | OXYGEN SATURATION: 98 % | RESPIRATION RATE: 16 BRPM | BODY MASS INDEX: 18.22 KG/M2

## 2021-04-06 DIAGNOSIS — B35.0 TINEA CAPITIS: ICD-10-CM

## 2021-04-06 DIAGNOSIS — Z00.00 ENCOUNTER FOR PREVENTIVE CARE: ICD-10-CM

## 2021-04-06 DIAGNOSIS — Z11.3 SCREEN FOR STD (SEXUALLY TRANSMITTED DISEASE): ICD-10-CM

## 2021-04-06 DIAGNOSIS — Z13.6 SCREENING FOR CARDIOVASCULAR CONDITION: ICD-10-CM

## 2021-04-06 DIAGNOSIS — Z13.1 DIABETES MELLITUS SCREENING: ICD-10-CM

## 2021-04-06 LAB
BASOPHILS # BLD AUTO: 0.3 % (ref 0–1.8)
BASOPHILS # BLD: 0.02 K/UL (ref 0–0.12)
EOSINOPHIL # BLD AUTO: 0.02 K/UL (ref 0–0.51)
EOSINOPHIL NFR BLD: 0.3 % (ref 0–6.9)
ERYTHROCYTE [DISTWIDTH] IN BLOOD BY AUTOMATED COUNT: 49.4 FL (ref 35.9–50)
HCT VFR BLD AUTO: 39.8 % (ref 37–47)
HGB BLD-MCNC: 12.6 G/DL (ref 12–16)
IMM GRANULOCYTES # BLD AUTO: 0.02 K/UL (ref 0–0.11)
IMM GRANULOCYTES NFR BLD AUTO: 0.3 % (ref 0–0.9)
LYMPHOCYTES # BLD AUTO: 1.24 K/UL (ref 1–4.8)
LYMPHOCYTES NFR BLD: 15.9 % (ref 22–41)
MCH RBC QN AUTO: 28.8 PG (ref 27–33)
MCHC RBC AUTO-ENTMCNC: 31.7 G/DL (ref 33.6–35)
MCV RBC AUTO: 91.1 FL (ref 81.4–97.8)
MONOCYTES # BLD AUTO: 0.58 K/UL (ref 0–0.85)
MONOCYTES NFR BLD AUTO: 7.5 % (ref 0–13.4)
NEUTROPHILS # BLD AUTO: 5.9 K/UL (ref 2–7.15)
NEUTROPHILS NFR BLD: 75.7 % (ref 44–72)
NRBC # BLD AUTO: 0 K/UL
NRBC BLD-RTO: 0 /100 WBC
PLATELET # BLD AUTO: 222 K/UL (ref 164–446)
PMV BLD AUTO: 12.2 FL (ref 9–12.9)
RBC # BLD AUTO: 4.37 M/UL (ref 4.2–5.4)
WBC # BLD AUTO: 7.8 K/UL (ref 4.8–10.8)

## 2021-04-06 PROCEDURE — 87340 HEPATITIS B SURFACE AG IA: CPT

## 2021-04-06 PROCEDURE — 84443 ASSAY THYROID STIM HORMONE: CPT

## 2021-04-06 PROCEDURE — 87491 CHLMYD TRACH DNA AMP PROBE: CPT

## 2021-04-06 PROCEDURE — 87591 N.GONORRHOEAE DNA AMP PROB: CPT

## 2021-04-06 PROCEDURE — 87389 HIV-1 AG W/HIV-1&-2 AB AG IA: CPT

## 2021-04-06 PROCEDURE — 85025 COMPLETE CBC W/AUTO DIFF WBC: CPT

## 2021-04-06 PROCEDURE — 36415 COLL VENOUS BLD VENIPUNCTURE: CPT

## 2021-04-06 PROCEDURE — 86780 TREPONEMA PALLIDUM: CPT

## 2021-04-06 PROCEDURE — 80053 COMPREHEN METABOLIC PANEL: CPT

## 2021-04-06 PROCEDURE — 86803 HEPATITIS C AB TEST: CPT

## 2021-04-06 PROCEDURE — 86706 HEP B SURFACE ANTIBODY: CPT

## 2021-04-06 PROCEDURE — 80061 LIPID PANEL: CPT

## 2021-04-06 PROCEDURE — 99214 OFFICE O/P EST MOD 30 MIN: CPT | Performed by: PHYSICIAN ASSISTANT

## 2021-04-06 RX ORDER — KETOCONAZOLE 20 MG/ML
SHAMPOO TOPICAL
Qty: 120 ML | Refills: 0 | Status: SHIPPED | OUTPATIENT
Start: 2021-04-06 | End: 2022-01-05

## 2021-04-06 RX ORDER — TERBINAFINE HYDROCHLORIDE 250 MG/1
250 TABLET ORAL DAILY
Qty: 42 TABLET | Refills: 0 | Status: SHIPPED | OUTPATIENT
Start: 2021-04-06 | End: 2021-05-18

## 2021-04-06 ASSESSMENT — ENCOUNTER SYMPTOMS
COUGH: 0
PALPITATIONS: 0
SHORTNESS OF BREATH: 0
MYALGIAS: 0
SORE THROAT: 0
FEVER: 0

## 2021-04-06 NOTE — PROGRESS NOTES
"Subjective:   Melva Garcia is a 20 y.o. female who presents for Skin Lesion (x3mo, skin lesions all over body, on scalp now, dizzy, chest pain)      Rash  This is a chronic problem. Episode onset: 3 months. The problem has been gradually worsening since onset. The affected locations include the scalp. The rash is characterized by dryness and itchiness. Associated with: tinea. Pertinent negatives include no cough, fever, joint pain, shortness of breath or sore throat. Treatments tried: antifungal. The treatment provided significant relief.       Review of Systems   Constitutional: Negative for fever and malaise/fatigue.   HENT: Negative for sore throat.    Respiratory: Negative for cough and shortness of breath.    Cardiovascular: Negative for chest pain and palpitations.   Musculoskeletal: Negative for joint pain and myalgias.   Skin: Positive for itching and rash.   All other systems reviewed and are negative.      Medications:    • clotrimazole-betamethasone Crea  • ketoconazole  • terbinafine Tabs  • TYLENOL CHILDRENS PO  • valACYclovir Tabs    Allergies: Pcn [penicillins]    Problem List: Melva Garcia has Genital herpes simplex and Labial infection on their problem list.    Surgical History:  No past surgical history on file.    Past Social Hx: Melva Garcia  reports that she has never smoked. She has never used smokeless tobacco. She reports previous alcohol use. She reports current drug use. Drug: Marijuana.     Past Family Hx:  Melva Garcia family history includes Psychiatric Illness in her mother.     Problem list, medications, and allergies reviewed by myself today in Epic.     Objective:     Blood Pressure 130/88 (BP Location: Left arm, Patient Position: Sitting, BP Cuff Size: Small adult)   Pulse 78   Temperature 36.9 °C (98.5 °F) (Temporal)   Respiration 16   Height 1.753 m (5' 9\")   Weight 55.8 kg (123 lb)   Oxygen Saturation 98%   Body Mass Index 18.16 kg/m²     Physical Exam  Vitals " reviewed.   Constitutional:       Appearance: She is well-developed.   Cardiovascular:      Rate and Rhythm: Normal rate and regular rhythm.      Heart sounds: Normal heart sounds.   Pulmonary:      Effort: Pulmonary effort is normal.      Breath sounds: Normal breath sounds.   Musculoskeletal:         General: Normal range of motion.   Skin:     General: Skin is warm and dry.      Findings: Rash present.      Comments: Rash distributions:  Located:scalp  Size: various  Pattern: papulosquamous   Neurological:      Mental Status: She is alert and oriented to person, place, and time.   Psychiatric:         Behavior: Behavior normal.         Thought Content: Thought content normal.         Judgment: Judgment normal.         Assessment/Plan:     Medical Decision Making/Comments     Pt is a 20 who presents for evaluation of a rash.  Pt states she was diagnosed with ring worm on her torso 3 months ago and prescribed an antifungal.  That rash has resolved but she has had multiple lesion on her scalp .  Pt endorses pruritis with the rash.  No prior episodes.  Pt denies fever, sore throat, or arthralgias.   No new medications in last 2-3 months.  No recent travel, time outdoors, or occupational exposure.  Pt is up to date with vaccinations.  Vital signs normal.  On exam the rash appears to be distributed in a papulosquamous pattern.  The rash is on scalp in different area but is unilateral.  It does not cover the extensor/flexor surfaces, palms/soles, or mucosal surfaces.  With previous history of tinea corporis this likely represents tinea capitus.  She will obtain labs ordered by her PCP to make sure liver function is normal before taking oral terbinafine.    Diagnosis differential includes, but not limited to:      Papulosquamous: syphilis, scabies, tinea, eczema, pityriasis rosea, psoriasis, lichen planus, lupus            Diagnosis and associated orders     1. Tinea capitis  ketoconazole (NIZORAL) 2 % shampoo     terbinafine (LAMISIL) 250 MG Tab    REFERRAL TO DERMATOLOGY              Differential diagnosis, natural history, supportive care, and indications for immediate follow-up discussed.    Advised the patient to follow-up with the primary care physician for recheck, reevaluation, and consideration of further management.    Please note that this dictation was created using voice recognition software. I have made a reasonable attempt to correct obvious errors, but I expect that there are errors of grammar and possibly content that I did not discover before finalizing the note.

## 2021-04-06 NOTE — PATIENT INSTRUCTIONS
Body Ringworm  Body ringworm is an infection of the skin that often causes a ring-shaped rash. Body ringworm is also called tinea corporis.  Body ringworm can affect any part of your skin. This condition is easily spread from person to person (is very contagious).  What are the causes?  This condition is caused by fungi called dermatophytes. The condition develops when these fungi grow out of control on the skin.  You can get this condition if you touch a person or animal that has it. You can also get it if you share any items with an infected person or pet. These include:  · Clothing, bedding, and towels.  · Brushes or altamirano.  · Gym equipment.  · Any other object that has the fungus on it.  What increases the risk?  You are more likely to develop this condition if you:  · Play sports that involve close physical contact, such as wrestling.  · Sweat a lot.  · Live in areas that are hot and humid.  · Use public showers.  · Have a weakened immune system.  What are the signs or symptoms?  Symptoms of this condition include:  · Itchy, raised red spots and bumps.  · Red scaly patches.  · A ring-shaped rash. The rash may have:  ? A clear center.  ? Scales or red bumps at its center.  ? Redness near its borders.  ? Dry and scaly skin on or around it.  How is this diagnosed?  This condition can usually be diagnosed with a skin exam. A skin scraping may be taken from the affected area and examined under a microscope to see if the fungus is present.  How is this treated?  This condition may be treated with:  · An antifungal cream or ointment.  · An antifungal shampoo.  · Antifungal medicines. These may be prescribed if your ringworm:  ? Is severe.  ? Keeps coming back.  ? Lasts a long time.  Follow these instructions at home:  · Take over-the-counter and prescription medicines only as told by your health care provider.  · If you were given an antifungal cream or ointment:  ? Use it as told by your health care provider.  ? Wash  the infected area and dry it completely before applying the cream or ointment.  · If you were given an antifungal shampoo:  ? Use it as told by your health care provider.  ? Leave the shampoo on your body for 3-5 minutes before rinsing.  · While you have a rash:  ? Wear loose clothing to stop clothes from rubbing and irritating it.  ? Wash or change your bed sheets every night.  ? Disinfect or throw out items that may be infected.  ? Wash clothes and bed sheets in hot water.  ? Wash your hands often with soap and water. If soap and water are not available, use hand .  · If your pet has the same infection, take your pet to see a  for treatment.  How is this prevented?  · Take a bath or shower every day and after every time you work out or play sports.  · Dry your skin completely after bathing.  · Wear sandals or shoes in public places and showers.  · Change your clothes every day.  · Wash athletic clothes after each use.  · Do not share personal items with others.  · Avoid touching red patches of skin on other people.  · Avoid touching pets that have bald spots.  · If you touch an animal that has a bald spot, wash your hands.  Contact a health care provider if:  · Your rash continues to spread after 7 days of treatment.  · Your rash is not gone in 4 weeks.  · The area around your rash gets red, warm, tender, and swollen.  Summary  · Body ringworm is an infection of the skin that often causes a ring-shaped rash.  · This condition is easily spread from person to person (is very contagious).  · This condition may be treated with antifungal cream or ointment, antifungal shampoo, or antifungal medicines.  · Take over-the-counter and prescription medicines only as told by your health care provider.  This information is not intended to replace advice given to you by your health care provider. Make sure you discuss any questions you have with your health care provider.  Document Released: 12/15/2001  Document Revised: 08/16/2019 Document Reviewed: 08/16/2019  Elsevier Patient Education © 2020 [employer]

## 2021-04-07 LAB
ALBUMIN SERPL BCP-MCNC: 4.4 G/DL (ref 3.2–4.9)
ALBUMIN/GLOB SERPL: 1.4 G/DL
ALP SERPL-CCNC: 67 U/L (ref 30–99)
ALT SERPL-CCNC: 5 U/L (ref 2–50)
ANION GAP SERPL CALC-SCNC: 12 MMOL/L (ref 7–16)
AST SERPL-CCNC: 13 U/L (ref 12–45)
BILIRUB SERPL-MCNC: 0.3 MG/DL (ref 0.1–1.5)
BUN SERPL-MCNC: 6 MG/DL (ref 8–22)
C TRACH DNA SPEC QL NAA+PROBE: NEGATIVE
CALCIUM SERPL-MCNC: 9.5 MG/DL (ref 8.5–10.5)
CHLORIDE SERPL-SCNC: 104 MMOL/L (ref 96–112)
CHOLEST SERPL-MCNC: 121 MG/DL (ref 100–199)
CO2 SERPL-SCNC: 21 MMOL/L (ref 20–33)
CREAT SERPL-MCNC: 0.69 MG/DL (ref 0.5–1.4)
GLOBULIN SER CALC-MCNC: 3.1 G/DL (ref 1.9–3.5)
GLUCOSE SERPL-MCNC: 83 MG/DL (ref 65–99)
HBV SURFACE AB SERPL IA-ACNC: <3.5 MIU/ML (ref 0–10)
HBV SURFACE AG SER QL: NORMAL
HCV AB SER QL: NORMAL
HDLC SERPL-MCNC: 72 MG/DL
HIV 1+2 AB+HIV1 P24 AG SERPL QL IA: NORMAL
LDLC SERPL CALC-MCNC: 40 MG/DL
N GONORRHOEA DNA SPEC QL NAA+PROBE: NEGATIVE
POTASSIUM SERPL-SCNC: 4.1 MMOL/L (ref 3.6–5.5)
PROT SERPL-MCNC: 7.5 G/DL (ref 6–8.2)
SODIUM SERPL-SCNC: 137 MMOL/L (ref 135–145)
SPECIMEN SOURCE: NORMAL
TREPONEMA PALLIDUM IGG+IGM AB [PRESENCE] IN SERUM OR PLASMA BY IMMUNOASSAY: NORMAL
TRIGL SERPL-MCNC: 47 MG/DL (ref 0–149)
TSH SERPL DL<=0.005 MIU/L-ACNC: 2.43 UIU/ML (ref 0.38–5.33)

## 2021-05-07 ENCOUNTER — OFFICE VISIT (OUTPATIENT)
Dept: DERMATOLOGY | Facility: IMAGING CENTER | Age: 21
End: 2021-05-07
Payer: COMMERCIAL

## 2021-05-07 DIAGNOSIS — R21 RASH AND NONSPECIFIC SKIN ERUPTION: ICD-10-CM

## 2021-05-07 PROCEDURE — 11104 PUNCH BX SKIN SINGLE LESION: CPT | Performed by: NURSE PRACTITIONER

## 2021-05-07 RX ORDER — VALACYCLOVIR HYDROCHLORIDE 1 G/1
1000 TABLET, FILM COATED ORAL EVERY EVENING
COMMUNITY
Start: 2021-04-16

## 2021-05-07 NOTE — PROGRESS NOTES
DERMATOLOGY NOTE  NEW VISIT       Chief complaint: Rash     HPI/location: poss ring worm on abd spot is now gone 1-2 mths ago ,   has now moved to scalp was dx with ringworm  Time present: 01/21  Painful lesion: Yes  Itching lesion: Yes  On Lamisil since 04/06/21 and using ketoconazole shampoo on scalp some improvement   Pt just wants to make sure its not another condition like PSO        Allergies   Allergen Reactions   • Pcn [Penicillins]         MEDICATIONS:  Medications relevant to specialty reviewed.     REVIEW OF SYSTEMS:   Positive for skin (see HPI)  Negative for fevers and chills       EXAM:  There were no vitals taken for this visit.  Constitutional: Well-developed, well-nourished, and in no distress.     A focused skin exam was performed including the affected areas of the head (including face). Notable findings on exam today listed below and/or in assessment/plan.     Two erythematous with scale patches to frontal and vertex of scalp    IMPRESSION / PLAN:    1. Rash and nonspecific skin eruption  Procedure Note   Procedure: Biopsy by punch technique  Location: frontal scalp  Preoperative diagnosis:fungal, pso, eczema  Risks, benefits and alternatives of procedure discussed and written informed consent obtained. Time out completed. Area of biopsy prepped with alcohol. Anesthesia with 1% lidocaine with epinephrine administered with a 30 gauge needle. 4  mm punch biopsy of site performed. Hemostasis achieved with pressure and 4.0 prolene sutures. Vaseline applied to wound with bandage. Patient tolerated procedure well, and there were no complications.  The pathology specimen was sent to the lab via the staff.  Wound care was discussed with the patient.          Please note that this dictation was created using voice recognition software. I have made every reasonable attempt to correct obvious errors, but I expect that there are errors of grammar and possibly content that I did not discover before finalizing  the note.      Return to clinic in: Return for pending biopsy results . and as needed for any new or changing skin lesions.

## 2021-05-13 ENCOUNTER — TELEPHONE (OUTPATIENT)
Dept: DERMATOLOGY | Facility: IMAGING CENTER | Age: 21
End: 2021-05-13

## 2021-05-13 ENCOUNTER — NON-PROVIDER VISIT (OUTPATIENT)
Dept: DERMATOLOGY | Facility: IMAGING CENTER | Age: 21
End: 2021-05-13
Payer: COMMERCIAL

## 2021-05-13 DIAGNOSIS — L21.9 SEBORRHEIC DERMATITIS OF SCALP: ICD-10-CM

## 2021-05-13 NOTE — TELEPHONE ENCOUNTER
Per Eve Ramírez APRN, patient notified of pathology results from 5/7/21 at suture removal appointment.  Patient agreeable to trying Rx for topical steroid.  Patient given appointment to follow up in 3 months.

## 2021-05-13 NOTE — NON-PROVIDER
Melva Garcia is a 20 y.o. female here for a Non-Provider Visit for Suture Removal.    Sutures were placed by MO Alberto, on date: 5/7/21  Skin is healed: Yes  Provider notified if skin is not healed, or if there is redness, heat, pain, or drainage from incision: N\A  Sutures removed.   Mastisol and steristips are placed: No    Advised to use emollient (vaseline, aquaphor, etc.) as needed, avoid peroxide and antibiotic ointment to reduce irritation.     Path report has been reviewed by provider.  Path report has reviewed with patient.

## 2021-05-17 RX ORDER — FLUOCINOLONE ACETONIDE 0.1 MG/ML
SOLUTION TOPICAL
Qty: 90 ML | Refills: 1 | Status: SHIPPED | OUTPATIENT
Start: 2021-05-17 | End: 2022-01-05

## 2021-05-17 NOTE — TELEPHONE ENCOUNTER
flucinolone sent to pharm on file to be used for 4 weeks on scalp. Please have pt schedule f/u for 4 weeks for re-check to assess how topical steroids are going.

## 2022-01-05 ENCOUNTER — APPOINTMENT (OUTPATIENT)
Dept: RADIOLOGY | Facility: MEDICAL CENTER | Age: 22
End: 2022-01-05
Attending: EMERGENCY MEDICINE
Payer: COMMERCIAL

## 2022-01-05 ENCOUNTER — ANESTHESIA (OUTPATIENT)
Dept: SURGERY | Facility: MEDICAL CENTER | Age: 22
End: 2022-01-05
Payer: COMMERCIAL

## 2022-01-05 ENCOUNTER — HOSPITAL ENCOUNTER (EMERGENCY)
Facility: MEDICAL CENTER | Age: 22
End: 2022-01-05
Attending: EMERGENCY MEDICINE
Payer: COMMERCIAL

## 2022-01-05 ENCOUNTER — OFFICE VISIT (OUTPATIENT)
Dept: URGENT CARE | Facility: CLINIC | Age: 22
End: 2022-01-05
Payer: COMMERCIAL

## 2022-01-05 ENCOUNTER — ANESTHESIA EVENT (OUTPATIENT)
Dept: SURGERY | Facility: MEDICAL CENTER | Age: 22
End: 2022-01-05
Payer: COMMERCIAL

## 2022-01-05 VITALS
HEIGHT: 69 IN | WEIGHT: 134.92 LBS | OXYGEN SATURATION: 99 % | RESPIRATION RATE: 18 BRPM | DIASTOLIC BLOOD PRESSURE: 75 MMHG | BODY MASS INDEX: 19.98 KG/M2 | HEART RATE: 69 BPM | SYSTOLIC BLOOD PRESSURE: 115 MMHG | TEMPERATURE: 97.8 F

## 2022-01-05 VITALS
TEMPERATURE: 98.2 F | BODY MASS INDEX: 19.79 KG/M2 | DIASTOLIC BLOOD PRESSURE: 70 MMHG | WEIGHT: 134 LBS | RESPIRATION RATE: 20 BRPM | SYSTOLIC BLOOD PRESSURE: 102 MMHG | OXYGEN SATURATION: 99 % | HEART RATE: 76 BPM

## 2022-01-05 VITALS
WEIGHT: 134.92 LBS | SYSTOLIC BLOOD PRESSURE: 133 MMHG | BODY MASS INDEX: 19.98 KG/M2 | HEIGHT: 69 IN | HEART RATE: 67 BPM | DIASTOLIC BLOOD PRESSURE: 79 MMHG | OXYGEN SATURATION: 99 % | TEMPERATURE: 98.2 F | RESPIRATION RATE: 18 BRPM

## 2022-01-05 DIAGNOSIS — O00.201 RIGHT OVARIAN PREGNANCY WITHOUT INTRAUTERINE PREGNANCY: ICD-10-CM

## 2022-01-05 DIAGNOSIS — R10.31 RLQ ABDOMINAL PAIN: ICD-10-CM

## 2022-01-05 DIAGNOSIS — Z32.01 POSITIVE URINE PREGNANCY TEST: ICD-10-CM

## 2022-01-05 DIAGNOSIS — N94.89 ADNEXAL MASS: ICD-10-CM

## 2022-01-05 DIAGNOSIS — R10.31 COLICKY RLQ ABDOMINAL PAIN: ICD-10-CM

## 2022-01-05 DIAGNOSIS — G89.18 POSTOPERATIVE PAIN: ICD-10-CM

## 2022-01-05 PROBLEM — O36.80X0 PREGNANCY OF UNKNOWN ANATOMIC LOCATION: Status: ACTIVE | Noted: 2022-01-05

## 2022-01-05 PROBLEM — O00.90 ECTOPIC PREGNANCY: Status: ACTIVE | Noted: 2022-01-05

## 2022-01-05 LAB
ABO + RH BLD: NORMAL
ABO GROUP BLD: NORMAL
ALBUMIN SERPL BCP-MCNC: 4.6 G/DL (ref 3.2–4.9)
ALBUMIN/GLOB SERPL: 1.8 G/DL
ALP SERPL-CCNC: 60 U/L (ref 30–99)
ALT SERPL-CCNC: 10 U/L (ref 2–50)
ANION GAP SERPL CALC-SCNC: 13 MMOL/L (ref 7–16)
APPEARANCE UR: CLEAR
APPEARANCE UR: CLEAR
AST SERPL-CCNC: 14 U/L (ref 12–45)
B-HCG SERPL-ACNC: 617.1 MIU/ML (ref 0–10)
BACTERIA GENITAL QL WET PREP: NORMAL
BASOPHILS # BLD AUTO: 0.2 % (ref 0–1.8)
BASOPHILS # BLD: 0.02 K/UL (ref 0–0.12)
BILIRUB SERPL-MCNC: 0.3 MG/DL (ref 0.1–1.5)
BILIRUB UR QL STRIP.AUTO: NEGATIVE
BILIRUB UR STRIP-MCNC: NORMAL MG/DL
BLD GP AB SCN SERPL QL: NORMAL
BUN SERPL-MCNC: 7 MG/DL (ref 8–22)
C TRACH DNA SPEC QL NAA+PROBE: NEGATIVE
CALCIUM SERPL-MCNC: 9.2 MG/DL (ref 8.4–10.2)
CHLORIDE SERPL-SCNC: 105 MMOL/L (ref 96–112)
CO2 SERPL-SCNC: 21 MMOL/L (ref 20–33)
COLOR UR AUTO: YELLOW
COLOR UR: YELLOW
CREAT SERPL-MCNC: 0.66 MG/DL (ref 0.5–1.4)
EOSINOPHIL # BLD AUTO: 0.01 K/UL (ref 0–0.51)
EOSINOPHIL NFR BLD: 0.1 % (ref 0–6.9)
ERYTHROCYTE [DISTWIDTH] IN BLOOD BY AUTOMATED COUNT: 43.5 FL (ref 35.9–50)
GLOBULIN SER CALC-MCNC: 2.6 G/DL (ref 1.9–3.5)
GLUCOSE SERPL-MCNC: 102 MG/DL (ref 65–99)
GLUCOSE UR STRIP.AUTO-MCNC: NEGATIVE MG/DL
GLUCOSE UR STRIP.AUTO-MCNC: NORMAL MG/DL
HCT VFR BLD AUTO: 40.7 % (ref 37–47)
HGB BLD-MCNC: 13.6 G/DL (ref 12–16)
IMM GRANULOCYTES # BLD AUTO: 0.05 K/UL (ref 0–0.11)
IMM GRANULOCYTES NFR BLD AUTO: 0.4 % (ref 0–0.9)
INT CON NEG: NORMAL
INT CON POS: NORMAL
KETONES UR STRIP.AUTO-MCNC: ABNORMAL MG/DL
KETONES UR STRIP.AUTO-MCNC: NORMAL MG/DL
LEUKOCYTE ESTERASE UR QL STRIP.AUTO: NEGATIVE
LEUKOCYTE ESTERASE UR QL STRIP.AUTO: NORMAL
LIPASE SERPL-CCNC: 19 U/L (ref 7–58)
LYMPHOCYTES # BLD AUTO: 1.02 K/UL (ref 1–4.8)
LYMPHOCYTES NFR BLD: 8.5 % (ref 22–41)
MCH RBC QN AUTO: 32.4 PG (ref 27–33)
MCHC RBC AUTO-ENTMCNC: 33.4 G/DL (ref 33.6–35)
MCV RBC AUTO: 96.9 FL (ref 81.4–97.8)
MICRO URNS: ABNORMAL
MONOCYTES # BLD AUTO: 0.52 K/UL (ref 0–0.85)
MONOCYTES NFR BLD AUTO: 4.3 % (ref 0–13.4)
N GONORRHOEA DNA SPEC QL NAA+PROBE: NEGATIVE
NEUTROPHILS # BLD AUTO: 10.34 K/UL (ref 2–7.15)
NEUTROPHILS NFR BLD: 86.5 % (ref 44–72)
NITRITE UR QL STRIP.AUTO: NEGATIVE
NITRITE UR QL STRIP.AUTO: NORMAL
NRBC # BLD AUTO: 0 K/UL
NRBC BLD-RTO: 0 /100 WBC
NUMBER OF RH DOSES IND 8505RD: NORMAL
PH UR STRIP.AUTO: 5.5 [PH] (ref 5–8)
PH UR STRIP.AUTO: 7 [PH] (ref 5–8)
PLATELET # BLD AUTO: 204 K/UL (ref 164–446)
PMV BLD AUTO: 11.8 FL (ref 9–12.9)
POC URINE PREGNANCY TEST: POSITIVE
POTASSIUM SERPL-SCNC: 3.8 MMOL/L (ref 3.6–5.5)
PROT SERPL-MCNC: 7.2 G/DL (ref 6–8.2)
PROT UR QL STRIP: NEGATIVE MG/DL
PROT UR QL STRIP: NORMAL MG/DL
RBC # BLD AUTO: 4.2 M/UL (ref 4.2–5.4)
RBC UR QL AUTO: NEGATIVE
RBC UR QL AUTO: NORMAL
RH BLD: NORMAL
RH BLD: NORMAL
SARS-COV+SARS-COV-2 AG RESP QL IA.RAPID: NOTDETECTED
SIGNIFICANT IND 70042: NORMAL
SITE SITE: NORMAL
SODIUM SERPL-SCNC: 139 MMOL/L (ref 135–145)
SOURCE SOURCE: NORMAL
SP GR UR STRIP.AUTO: 1.01
SP GR UR STRIP.AUTO: <=1.005
SPECIMEN SOURCE: NORMAL
SPECIMEN SOURCE: NORMAL
UROBILINOGEN UR STRIP-MCNC: 0.2 MG/DL
WBC # BLD AUTO: 12 K/UL (ref 4.8–10.8)

## 2022-01-05 PROCEDURE — 501583 HCHG TROCAR, THRD CAN&SEAL 5X100: Performed by: STUDENT IN AN ORGANIZED HEALTH CARE EDUCATION/TRAINING PROGRAM

## 2022-01-05 PROCEDURE — 160002 HCHG RECOVERY MINUTES (STAT): Performed by: STUDENT IN AN ORGANIZED HEALTH CARE EDUCATION/TRAINING PROGRAM

## 2022-01-05 PROCEDURE — 500886 HCHG PACK, LAPAROSCOPY: Performed by: STUDENT IN AN ORGANIZED HEALTH CARE EDUCATION/TRAINING PROGRAM

## 2022-01-05 PROCEDURE — 160009 HCHG ANES TIME/MIN: Performed by: STUDENT IN AN ORGANIZED HEALTH CARE EDUCATION/TRAINING PROGRAM

## 2022-01-05 PROCEDURE — 86850 RBC ANTIBODY SCREEN: CPT

## 2022-01-05 PROCEDURE — 160046 HCHG PACU - 1ST 60 MINS PHASE II: Performed by: STUDENT IN AN ORGANIZED HEALTH CARE EDUCATION/TRAINING PROGRAM

## 2022-01-05 PROCEDURE — 85025 COMPLETE CBC W/AUTO DIFF WBC: CPT

## 2022-01-05 PROCEDURE — 80053 COMPREHEN METABOLIC PANEL: CPT

## 2022-01-05 PROCEDURE — 700101 HCHG RX REV CODE 250: Performed by: STUDENT IN AN ORGANIZED HEALTH CARE EDUCATION/TRAINING PROGRAM

## 2022-01-05 PROCEDURE — 87426 SARSCOV CORONAVIRUS AG IA: CPT

## 2022-01-05 PROCEDURE — 99283 EMERGENCY DEPT VISIT LOW MDM: CPT | Mod: 57 | Performed by: STUDENT IN AN ORGANIZED HEALTH CARE EDUCATION/TRAINING PROGRAM

## 2022-01-05 PROCEDURE — 700105 HCHG RX REV CODE 258: Performed by: EMERGENCY MEDICINE

## 2022-01-05 PROCEDURE — 36415 COLL VENOUS BLD VENIPUNCTURE: CPT

## 2022-01-05 PROCEDURE — 86900 BLOOD TYPING SEROLOGIC ABO: CPT

## 2022-01-05 PROCEDURE — 700102 HCHG RX REV CODE 250 W/ 637 OVERRIDE(OP): Performed by: INTERNAL MEDICINE

## 2022-01-05 PROCEDURE — 83690 ASSAY OF LIPASE: CPT

## 2022-01-05 PROCEDURE — 700111 HCHG RX REV CODE 636 W/ 250 OVERRIDE (IP): Performed by: EMERGENCY MEDICINE

## 2022-01-05 PROCEDURE — 160039 HCHG SURGERY MINUTES - EA ADDL 1 MIN LEVEL 3: Performed by: STUDENT IN AN ORGANIZED HEALTH CARE EDUCATION/TRAINING PROGRAM

## 2022-01-05 PROCEDURE — 160028 HCHG SURGERY MINUTES - 1ST 30 MINS LEVEL 3: Performed by: STUDENT IN AN ORGANIZED HEALTH CARE EDUCATION/TRAINING PROGRAM

## 2022-01-05 PROCEDURE — 96375 TX/PRO/DX INJ NEW DRUG ADDON: CPT

## 2022-01-05 PROCEDURE — 160048 HCHG OR STATISTICAL LEVEL 1-5: Performed by: STUDENT IN AN ORGANIZED HEALTH CARE EDUCATION/TRAINING PROGRAM

## 2022-01-05 PROCEDURE — 700111 HCHG RX REV CODE 636 W/ 250 OVERRIDE (IP): Performed by: INTERNAL MEDICINE

## 2022-01-05 PROCEDURE — 99285 EMERGENCY DEPT VISIT HI MDM: CPT

## 2022-01-05 PROCEDURE — 501570 HCHG TROCAR, SEPARATOR: Performed by: STUDENT IN AN ORGANIZED HEALTH CARE EDUCATION/TRAINING PROGRAM

## 2022-01-05 PROCEDURE — 502240 HCHG MISC OR SUPPLY RC 0272: Performed by: STUDENT IN AN ORGANIZED HEALTH CARE EDUCATION/TRAINING PROGRAM

## 2022-01-05 PROCEDURE — A9270 NON-COVERED ITEM OR SERVICE: HCPCS | Performed by: INTERNAL MEDICINE

## 2022-01-05 PROCEDURE — 81002 URINALYSIS NONAUTO W/O SCOPE: CPT | Performed by: PHYSICIAN ASSISTANT

## 2022-01-05 PROCEDURE — 87491 CHLMYD TRACH DNA AMP PROBE: CPT

## 2022-01-05 PROCEDURE — 81003 URINALYSIS AUTO W/O SCOPE: CPT

## 2022-01-05 PROCEDURE — 160035 HCHG PACU - 1ST 60 MINS PHASE I: Performed by: STUDENT IN AN ORGANIZED HEALTH CARE EDUCATION/TRAINING PROGRAM

## 2022-01-05 PROCEDURE — 58661 LAPAROSCOPY REMOVE ADNEXA: CPT | Performed by: STUDENT IN AN ORGANIZED HEALTH CARE EDUCATION/TRAINING PROGRAM

## 2022-01-05 PROCEDURE — 501838 HCHG SUTURE GENERAL: Performed by: STUDENT IN AN ORGANIZED HEALTH CARE EDUCATION/TRAINING PROGRAM

## 2022-01-05 PROCEDURE — 87591 N.GONORRHOEAE DNA AMP PROB: CPT

## 2022-01-05 PROCEDURE — 700105 HCHG RX REV CODE 258: Performed by: INTERNAL MEDICINE

## 2022-01-05 PROCEDURE — 500868 HCHG NEEDLE, SURGI(VARES): Performed by: STUDENT IN AN ORGANIZED HEALTH CARE EDUCATION/TRAINING PROGRAM

## 2022-01-05 PROCEDURE — 700101 HCHG RX REV CODE 250: Performed by: INTERNAL MEDICINE

## 2022-01-05 PROCEDURE — 99291 CRITICAL CARE FIRST HOUR: CPT

## 2022-01-05 PROCEDURE — 88305 TISSUE EXAM BY PATHOLOGIST: CPT

## 2022-01-05 PROCEDURE — 160025 RECOVERY II MINUTES (STATS): Performed by: STUDENT IN AN ORGANIZED HEALTH CARE EDUCATION/TRAINING PROGRAM

## 2022-01-05 PROCEDURE — 86901 BLOOD TYPING SEROLOGIC RH(D): CPT

## 2022-01-05 PROCEDURE — 81025 URINE PREGNANCY TEST: CPT | Performed by: PHYSICIAN ASSISTANT

## 2022-01-05 PROCEDURE — 84702 CHORIONIC GONADOTROPIN TEST: CPT

## 2022-01-05 PROCEDURE — 76801 OB US < 14 WKS SINGLE FETUS: CPT

## 2022-01-05 PROCEDURE — 99214 OFFICE O/P EST MOD 30 MIN: CPT | Performed by: PHYSICIAN ASSISTANT

## 2022-01-05 PROCEDURE — 96374 THER/PROPH/DIAG INJ IV PUSH: CPT

## 2022-01-05 PROCEDURE — 502703 HCHG DEVICE, LIGASURE V SEALER: Performed by: STUDENT IN AN ORGANIZED HEALTH CARE EDUCATION/TRAINING PROGRAM

## 2022-01-05 RX ORDER — LIDOCAINE HYDROCHLORIDE 40 MG/ML
SOLUTION TOPICAL PRN
Status: DISCONTINUED | OUTPATIENT
Start: 2022-01-05 | End: 2022-01-05 | Stop reason: SURG

## 2022-01-05 RX ORDER — ONDANSETRON 2 MG/ML
INJECTION INTRAMUSCULAR; INTRAVENOUS PRN
Status: DISCONTINUED | OUTPATIENT
Start: 2022-01-05 | End: 2022-01-05 | Stop reason: SURG

## 2022-01-05 RX ORDER — LABETALOL HYDROCHLORIDE 5 MG/ML
5 INJECTION, SOLUTION INTRAVENOUS
Status: DISCONTINUED | OUTPATIENT
Start: 2022-01-05 | End: 2022-01-05 | Stop reason: HOSPADM

## 2022-01-05 RX ORDER — SODIUM CHLORIDE, SODIUM LACTATE, POTASSIUM CHLORIDE, CALCIUM CHLORIDE 600; 310; 30; 20 MG/100ML; MG/100ML; MG/100ML; MG/100ML
INJECTION, SOLUTION INTRAVENOUS
Status: DISCONTINUED | OUTPATIENT
Start: 2022-01-05 | End: 2022-01-05 | Stop reason: SURG

## 2022-01-05 RX ORDER — KETOROLAC TROMETHAMINE 30 MG/ML
INJECTION, SOLUTION INTRAMUSCULAR; INTRAVENOUS PRN
Status: DISCONTINUED | OUTPATIENT
Start: 2022-01-05 | End: 2022-01-05 | Stop reason: SURG

## 2022-01-05 RX ORDER — ONDANSETRON 2 MG/ML
4 INJECTION INTRAMUSCULAR; INTRAVENOUS
Status: DISCONTINUED | OUTPATIENT
Start: 2022-01-05 | End: 2022-01-05 | Stop reason: HOSPADM

## 2022-01-05 RX ORDER — MEPERIDINE HYDROCHLORIDE 25 MG/ML
12.5 INJECTION INTRAMUSCULAR; INTRAVENOUS; SUBCUTANEOUS
Status: DISCONTINUED | OUTPATIENT
Start: 2022-01-05 | End: 2022-01-05 | Stop reason: HOSPADM

## 2022-01-05 RX ORDER — LIDOCAINE HYDROCHLORIDE 20 MG/ML
INJECTION, SOLUTION EPIDURAL; INFILTRATION; INTRACAUDAL; PERINEURAL PRN
Status: DISCONTINUED | OUTPATIENT
Start: 2022-01-05 | End: 2022-01-05 | Stop reason: SURG

## 2022-01-05 RX ORDER — HYDROMORPHONE HYDROCHLORIDE 1 MG/ML
0.4 INJECTION, SOLUTION INTRAMUSCULAR; INTRAVENOUS; SUBCUTANEOUS
Status: DISCONTINUED | OUTPATIENT
Start: 2022-01-05 | End: 2022-01-05 | Stop reason: HOSPADM

## 2022-01-05 RX ORDER — IBUPROFEN 200 MG
400 TABLET ORAL EVERY 6 HOURS PRN
Status: ON HOLD | COMMUNITY
End: 2022-01-05

## 2022-01-05 RX ORDER — DIPHENHYDRAMINE HYDROCHLORIDE 50 MG/ML
12.5 INJECTION INTRAMUSCULAR; INTRAVENOUS
Status: DISCONTINUED | OUTPATIENT
Start: 2022-01-05 | End: 2022-01-05 | Stop reason: HOSPADM

## 2022-01-05 RX ORDER — IBUPROFEN 400 MG/1
TABLET ORAL
Qty: 60 TABLET | Refills: 1 | Status: SHIPPED | OUTPATIENT
Start: 2022-01-05 | End: 2023-07-27

## 2022-01-05 RX ORDER — OXYCODONE HCL 5 MG/5 ML
5 SOLUTION, ORAL ORAL
Status: COMPLETED | OUTPATIENT
Start: 2022-01-05 | End: 2022-01-05

## 2022-01-05 RX ORDER — ACETAMINOPHEN 500 MG
TABLET ORAL
Qty: 60 TABLET | Refills: 1 | Status: SHIPPED | OUTPATIENT
Start: 2022-01-05 | End: 2023-07-27

## 2022-01-05 RX ORDER — SODIUM CHLORIDE 9 MG/ML
1000 INJECTION, SOLUTION INTRAVENOUS ONCE
Status: COMPLETED | OUTPATIENT
Start: 2022-01-05 | End: 2022-01-05

## 2022-01-05 RX ORDER — OXYCODONE HCL 5 MG/5 ML
10 SOLUTION, ORAL ORAL
Status: COMPLETED | OUTPATIENT
Start: 2022-01-05 | End: 2022-01-05

## 2022-01-05 RX ORDER — HALOPERIDOL 5 MG/ML
1 INJECTION INTRAMUSCULAR
Status: DISCONTINUED | OUTPATIENT
Start: 2022-01-05 | End: 2022-01-05 | Stop reason: HOSPADM

## 2022-01-05 RX ORDER — DEXAMETHASONE SODIUM PHOSPHATE 4 MG/ML
INJECTION, SOLUTION INTRA-ARTICULAR; INTRALESIONAL; INTRAMUSCULAR; INTRAVENOUS; SOFT TISSUE PRN
Status: DISCONTINUED | OUTPATIENT
Start: 2022-01-05 | End: 2022-01-05 | Stop reason: SURG

## 2022-01-05 RX ORDER — HYDROMORPHONE HYDROCHLORIDE 1 MG/ML
0.1 INJECTION, SOLUTION INTRAMUSCULAR; INTRAVENOUS; SUBCUTANEOUS
Status: DISCONTINUED | OUTPATIENT
Start: 2022-01-05 | End: 2022-01-05 | Stop reason: HOSPADM

## 2022-01-05 RX ORDER — HYDROMORPHONE HYDROCHLORIDE 1 MG/ML
0.2 INJECTION, SOLUTION INTRAMUSCULAR; INTRAVENOUS; SUBCUTANEOUS
Status: DISCONTINUED | OUTPATIENT
Start: 2022-01-05 | End: 2022-01-05 | Stop reason: HOSPADM

## 2022-01-05 RX ORDER — BUPIVACAINE HYDROCHLORIDE AND EPINEPHRINE 2.5; 5 MG/ML; UG/ML
INJECTION, SOLUTION EPIDURAL; INFILTRATION; INTRACAUDAL; PERINEURAL
Status: DISCONTINUED | OUTPATIENT
Start: 2022-01-05 | End: 2022-01-05 | Stop reason: HOSPADM

## 2022-01-05 RX ORDER — HYDROMORPHONE HYDROCHLORIDE 2 MG/ML
INJECTION, SOLUTION INTRAMUSCULAR; INTRAVENOUS; SUBCUTANEOUS PRN
Status: DISCONTINUED | OUTPATIENT
Start: 2022-01-05 | End: 2022-01-05 | Stop reason: SURG

## 2022-01-05 RX ORDER — HYDRALAZINE HYDROCHLORIDE 20 MG/ML
5 INJECTION INTRAMUSCULAR; INTRAVENOUS
Status: DISCONTINUED | OUTPATIENT
Start: 2022-01-05 | End: 2022-01-05 | Stop reason: HOSPADM

## 2022-01-05 RX ORDER — SCOLOPAMINE TRANSDERMAL SYSTEM 1 MG/1
PATCH, EXTENDED RELEASE TRANSDERMAL
Status: DISCONTINUED
Start: 2022-01-05 | End: 2022-01-05 | Stop reason: HOSPADM

## 2022-01-05 RX ORDER — ONDANSETRON 2 MG/ML
4 INJECTION INTRAMUSCULAR; INTRAVENOUS ONCE
Status: COMPLETED | OUTPATIENT
Start: 2022-01-05 | End: 2022-01-05

## 2022-01-05 RX ADMIN — ONDANSETRON 4 MG: 2 INJECTION INTRAMUSCULAR; INTRAVENOUS at 17:19

## 2022-01-05 RX ADMIN — ONDANSETRON 4 MG: 2 INJECTION INTRAMUSCULAR; INTRAVENOUS at 13:43

## 2022-01-05 RX ADMIN — MIDAZOLAM 2 MG: 1 INJECTION INTRAMUSCULAR; INTRAVENOUS at 16:39

## 2022-01-05 RX ADMIN — KETOROLAC TROMETHAMINE 30 MG: 30 INJECTION, SOLUTION INTRAMUSCULAR at 17:19

## 2022-01-05 RX ADMIN — LIDOCAINE HYDROCHLORIDE 50 MG: 20 INJECTION, SOLUTION EPIDURAL; INFILTRATION; INTRACAUDAL at 16:43

## 2022-01-05 RX ADMIN — FENTANYL CITRATE 50 MCG: 50 INJECTION, SOLUTION INTRAMUSCULAR; INTRAVENOUS at 13:46

## 2022-01-05 RX ADMIN — OXYCODONE HYDROCHLORIDE 5 MG: 5 SOLUTION ORAL at 17:59

## 2022-01-05 RX ADMIN — LIDOCAINE HYDROCHLORIDE 4 ML: 40 SOLUTION TOPICAL at 16:45

## 2022-01-05 RX ADMIN — SODIUM CHLORIDE, POTASSIUM CHLORIDE, SODIUM LACTATE AND CALCIUM CHLORIDE: 600; 310; 30; 20 INJECTION, SOLUTION INTRAVENOUS at 16:39

## 2022-01-05 RX ADMIN — HYDROMORPHONE HYDROCHLORIDE 0.25 MG: 2 INJECTION, SOLUTION INTRAMUSCULAR; INTRAVENOUS; SUBCUTANEOUS at 17:06

## 2022-01-05 RX ADMIN — FENTANYL CITRATE 100 MCG: 50 INJECTION, SOLUTION INTRAMUSCULAR; INTRAVENOUS at 16:39

## 2022-01-05 RX ADMIN — SUGAMMADEX 200 MG: 100 INJECTION, SOLUTION INTRAVENOUS at 17:34

## 2022-01-05 RX ADMIN — SODIUM CHLORIDE 1000 ML: 9 INJECTION, SOLUTION INTRAVENOUS at 13:43

## 2022-01-05 RX ADMIN — DEXAMETHASONE SODIUM PHOSPHATE 8 MG: 4 INJECTION, SOLUTION INTRA-ARTICULAR; INTRALESIONAL; INTRAMUSCULAR; INTRAVENOUS; SOFT TISSUE at 16:43

## 2022-01-05 RX ADMIN — HYDROMORPHONE HYDROCHLORIDE 0.25 MG: 2 INJECTION, SOLUTION INTRAMUSCULAR; INTRAVENOUS; SUBCUTANEOUS at 16:50

## 2022-01-05 RX ADMIN — PROPOFOL 200 MG: 10 INJECTION, EMULSION INTRAVENOUS at 16:43

## 2022-01-05 RX ADMIN — ROCURONIUM BROMIDE 40 MG: 10 INJECTION, SOLUTION INTRAVENOUS at 16:43

## 2022-01-05 ASSESSMENT — ENCOUNTER SYMPTOMS
FEVER: 0
VOMITING: 0
ANOREXIA: 1
NAUSEA: 1
ABDOMINAL PAIN: 1
CONSTIPATION: 0
DIARRHEA: 0

## 2022-01-05 ASSESSMENT — FIBROSIS 4 INDEX
FIB4 SCORE: 0.6
FIB4 SCORE: 0.5
FIB4 SCORE: 0.6

## 2022-01-05 ASSESSMENT — PAIN DESCRIPTION - PAIN TYPE
TYPE: SURGICAL PAIN

## 2022-01-05 ASSESSMENT — LIFESTYLE VARIABLES
DO YOU DRINK ALCOHOL: NO
DOES PATIENT WANT TO STOP DRINKING: NO

## 2022-01-05 NOTE — ED NOTES
IV med's given per order  [facility] at  for transfer to [facility] for further care and treatment   IV NS infusing well at this time

## 2022-01-05 NOTE — ED NOTES
"Med rec completed by [employer] rec tech at [facility] on 1/5/2022, prior to transfer:    \"Med rec updated and complete, per pt  Allergies reviewed, per pt  Interviewed pt with boyfriend at bedside with permission from pt.\"  "

## 2022-01-05 NOTE — ED NOTES
PIV placed in RAC, blood drawn and sent to lab.  Pt and visitor updated on POC including pending tests and chart review by ERP.

## 2022-01-05 NOTE — PROGRESS NOTES
Subjective     Melva Garcia is a 21 y.o. female who presents with RLQ Pain (x this am, Sharp lower abdominal pain, nausea)            Patient presents with:  RLQ Pain: x this am, Sharp lower abdominal pain, nausea.  Patient states this is the worst pain of her life and it is significant and severe, 10 out of 10 then when it improves to 7 out of 10., comes and goes in waves.  Patient has not taken any over-the-counter medications for her symptoms.  Patient has not had anything to eattoday because she does not feel well, though she states she has been sipping on a little bit of water.  Patient denies any other symptoms.  Patient is sexually active, last menstrual period was 12/22/2021.  Patient states it was a normal period for her.  Patient states pain is worse when sitting up, improves when lying flat and being still.        RLQ Pain  This is a new problem. The current episode started today. The onset quality is sudden. The problem occurs constantly. The problem has been waxing and waning. The pain is located in the RLQ. Pain scale: Varies 7/10-10/10. The quality of the pain is sharp, cramping and burning. The abdominal pain does not radiate. Associated symptoms include anorexia and nausea. Pertinent negatives include no constipation, diarrhea, dysuria, fever, frequency, hematuria or vomiting. The pain is aggravated by certain positions, movement, palpation and deep breathing. The pain is relieved by recumbency. She has tried nothing for the symptoms. The treatment provided no relief.       Review of Systems   Constitutional: Negative for fever.   Gastrointestinal: Positive for abdominal pain (RLQ), anorexia and nausea. Negative for constipation, diarrhea and vomiting.   Genitourinary: Negative for dysuria, frequency and hematuria.   All other systems reviewed and are negative.             Objective     /70 (BP Location: Left arm, Patient Position: Sitting, BP Cuff Size: Adult)   Pulse 76   Temp 36.8 °C  (98.2 °F) (Temporal)   Resp 20   Wt 60.8 kg (134 lb)   LMP 12/22/2021   SpO2 99%   Breastfeeding No   BMI 19.79 kg/m²      Physical Exam  Vitals and nursing note reviewed.   Constitutional:       General: She is not in acute distress.     Appearance: Normal appearance. She is well-developed and normal weight. She is not ill-appearing or toxic-appearing.   HENT:      Head: Normocephalic and atraumatic.      Right Ear: Tympanic membrane normal.      Left Ear: Tympanic membrane normal.      Nose: Nose normal.      Mouth/Throat:      Lips: Pink.      Mouth: Mucous membranes are moist.      Pharynx: Oropharynx is clear. Uvula midline.   Eyes:      Extraocular Movements: Extraocular movements intact.      Conjunctiva/sclera: Conjunctivae normal.      Pupils: Pupils are equal, round, and reactive to light.   Cardiovascular:      Rate and Rhythm: Normal rate and regular rhythm.      Pulses: Normal pulses.      Heart sounds: Normal heart sounds.   Pulmonary:      Effort: Pulmonary effort is normal.      Breath sounds: Normal breath sounds.   Abdominal:      General: Abdomen is flat. Bowel sounds are normal.      Palpations: Abdomen is soft.      Tenderness: There is abdominal tenderness in the right lower quadrant. There is guarding (voluntary ). There is no rebound. Negative signs include Miles's sign, Rovsing's sign, psoas sign and obturator sign.      Comments: TTP in RLQ though seems between McBurney's and adnexa.  Pt is voluntarily guarding, difficult to pinpoint. No rebound.    Musculoskeletal:         General: Normal range of motion.      Cervical back: Normal range of motion and neck supple.   Skin:     General: Skin is warm and dry.      Capillary Refill: Capillary refill takes less than 2 seconds.   Neurological:      General: No focal deficit present.      Mental Status: She is alert and oriented to person, place, and time.      Cranial Nerves: No cranial nerve deficit.      Motor: Motor function is intact.       Coordination: Coordination is intact.      Gait: Gait normal.   Psychiatric:         Mood and Affect: Mood normal.            UA: trace blood, small LE    POCT pregnancy is POSITIVE.                    Assessment & Plan              1. Colicky RLQ abdominal pain  POCT Urinalysis    POCT Pregnancy   2. Positive urine pregnancy test       Patient was evaluated in clinic today while wearing appropriate personal protective equipment.        Patient presents clinic today complaining of significant and severe right lower quadrant pain, in her words worst of her life.  On exam patient has voluntary guarding on right lower quadrant, has pain relief with recumbency.  UA is normal.  To patient surprise her pregnancy test is positive in clinic today.      Because of the positive pregnancy test, and significant right lower quadrant pain, I have not instructed patient to go to the emergency department for further evaluation as this is an urgent care clinic and I do not have access to stat labs nor do I have stat ultrasound available.      Differential diagnosis which was discussed with the patient could include but is not limited to:  acute ectopic pregnancy, acute appendicitis, kidney stone or bladder stone.  I have discussed with this differential diagnosis with the patient, I have instructed her to remain n.p.o., she agrees that she will not eat or drink anything.    Patient's boyfriend will drive patient to the emergency department by POV as patient's vital signs are normal at this time.  Patient discharged.

## 2022-01-05 NOTE — H&P
GYN Consultation     Melva Garcia  2000  [id number]     CC/reason for consult: ruptured ectopic, from ED at [facility].      HPI: Melva Garcia is a 21 y.o. , LMP 21 with 1 day h/o sharp severe RLQ pain. Denies vaginal bleeding. She had a positive pregnancy test at urgent care and was sent to the ED for further workup. She reports that this period was lighter and lasted longer than usual. Not using any contraception currenlty. This is an unexpected pregnancy, but not undesired.      Pelvic US demonstrated a right adnexal mass between the uterus and normal right ovary highly suspicious for ectopic pregnancy, with no intrauterine pregnancy seen. Some echogenic material in the culdesac concerning for hemorrhage.                        OB History    Para Term  AB Living   0 0 0 0 0 0   SAB IAB Ectopic Molar Multiple Live Births    0 0 0 0 0 0         GYN Hx:   Periods reg  Denies hx of STIs  Denies hx of abnl paps      Past Medical History   No past medical history on file.        Past Surgical History   No past surgical history on file.        Medications:   Current Medications and Prescriptions Ordered in Epic   No current Epic-ordered facility-administered medications on file.             Current Outpatient Medications Ordered in Epic   Medication Sig Dispense Refill   • ibuprofen (MOTRIN) 200 MG Tab Take 400 mg by mouth every 6 hours as needed for Mild Pain.       • valacyclovir (VALTREX) 1 GM Tab Take 1,000 mg by mouth every evening.                Allergies: Other food and Pcn [penicillins]     Social History               Socioeconomic History   • Marital status: Legally        Spouse name: Not on file   • Number of children: Not on file   • Years of education: Not on file   • Highest education level: Not on file   Occupational History   • Not on file   Tobacco Use   • Smoking status: Never Smoker   • Smokeless tobacco: Never Used   Vaping Use   • Vaping  "Use: Never used   Substance and Sexual Activity   • Alcohol use: Not Currently       Comment: occcasional    • Drug use: Yes       Types: Marijuana       Comment: 2 times a day    • Sexual activity: Yes       Partners: Male   Other Topics Concern   • Not on file   Social History Narrative   • Not on file      Social Determinants of Health          Financial Resource Strain:    • Difficulty of Paying Living Expenses: Not on file   Food Insecurity:    • Worried About Running Out of Food in the Last Year: Not on file   • Ran Out of Food in the Last Year: Not on file   Transportation Needs:    • Lack of Transportation (Medical): Not on file   • Lack of Transportation (Non-Medical): Not on file   Physical Activity:    • Days of Exercise per Week: Not on file   • Minutes of Exercise per Session: Not on file   Stress:    • Feeling of Stress : Not on file   Social Connections:    • Frequency of Communication with Friends and Family: Not on file   • Frequency of Social Gatherings with Friends and Family: Not on file   • Attends Faith Services: Not on file   • Active Member of Clubs or Organizations: Not on file   • Attends Club or Organization Meetings: Not on file   • Marital Status: Not on file   Intimate Partner Violence:    • Fear of Current or Ex-Partner: Not on file   • Emotionally Abused: Not on file   • Physically Abused: Not on file   • Sexually Abused: Not on file   Housing Stability:    • Unable to Pay for Housing in the Last Year: Not on file   • Number of Places Lived in the Last Year: Not on file   • Unstable Housing in the Last Year: Not on file            Family History         Family History   Problem Relation Age of Onset   • Psychiatric Illness Mother           Borderline personality disorder            Physical Exam:  /79   Pulse 67   Temp 36.8 °C (98.2 °F) (Temporal)   Resp 18   Ht 1.753 m (5' 9\")   Wt 61.2 kg (134 lb 14.7 oz)   LMP 12/22/2021   SpO2 99%   BMI 19.92 kg/m²   gen: MELISSA " NAD, affect appropriate  resp: ctab  abd: soft, RLQ tenderness, no rebound, ND, no masses,   : deferred to OR  Skin: warm/dry, no lesions     Labs:   Rh +  BHc.1 miU/mL  Creat 0.66  Hgb 13.6           Pelvic US 22 - ( I personally reviewed images and agree with radiographic findings below)  There is a poorly defined echogenic mass measuring approximately 3 cm between the normal-appearing right ovary and uterus. Surrounding this there is echogenic material which also surrounds the right ovary. There is also trace anechoic material in the   cul-de-sac. No gestational sac is seen in either adnexa or in the uterus.  The uterus is normal in appearance, anterior position and measures approximately 7 x 4 cm  The maternal ovaries are unremarkable. There is no enlarged cyst. Low resistive waveforms are confirmed.     IMPRESSION:  Right adnexal mass interposed between the uterus and normal-appearing right ovary is very worrisome for an ectopic pregnancy. There is echogenic adjacent material worrisome for hemorrhage  No intrauterine gestation is identified  Normal sonographic appearance of the ovaries     A/P: 21 y.o.  with high suspicion of ectopic pregnancy, possible early rupture, given radiographic findings, pain profile, sudden onset, bhcg level. She is Rh+ and currently stable  - Counseled on options for management. Due to her symptoms, sonogrpahic findings and desired pregnancy if not ectopic, I am less inclined to recommend I do not receommend medical management with methotrexate, and instead recommend surgical assessment.      She was consented for: Pelvic exam under anesthesia, diagnostic laparoscopy, possible salpingectomy. if no ectopic is found I will not remove a tube, and will not perform D&C at patient request. Instead, plan would be to follow bHCG to confirm pregnancy vs SAB.. Benefits of surgery were reviewed, including functional outcomes (bladder/bowel/sexual). Risks of surgery were  also  discussed including anesthesia, bleeding, infection, damage to surrounding organs (bladder, ureter, urethra, bowel, blood vessel, nerves), possible blood transfusion.   - Counseled on fertility after salpingectomy if needed, and that future pregnancy is possible. She was also counseled on elevated risk of future ectopic pregnancy  - Will discuss contraception, if desired, at post-operative visit.                  Murray Barriga MD, FACOG    Female Pelvic Medicine and Reconstructive Surgery  Department of Obstetrics and Gynecology  [facility]

## 2022-01-05 NOTE — ED NOTES
Pt amb to triage c/o 8/10 RLQ pain since this am; pt seen at [facility] and positive pregn test. LMP: 21.

## 2022-01-05 NOTE — ED TRIAGE NOTES
Patient arrived form  as a transfer for right side ectopic pregnancy. Dr. Childress was the accepting. LMP 12/22/2021. Note to contact Dr. Barriga on arrival.     Pt educated on ED process and asked to wait in lobby. Patient educated on importance of alerting staff to new or worsening symptoms or concerns.

## 2022-01-05 NOTE — ED NOTES
Called and gave report to Con RO charge at [facility] ED  Informed them pt on her way there now via MINDY

## 2022-01-05 NOTE — DISCHARGE PLANNING
Anticipated Discharge Disposition: ER to ER    Action: Will go emergently ER to Er. Chart copy to go w pt. COBRA completed. PCS completed. Voalte to Dr Barriga and MD vasquez speak.    Barriers to Discharge: [facility] to transport    Plan: No further needs/

## 2022-01-05 NOTE — ED NOTES
Med rec updated and complete, per pt  Allergies reviewed, per pt  Interviewed pt with boyfriend at bedside with permission from pt.

## 2022-01-05 NOTE — ED PROVIDER NOTES
LabED Provider Note    CHIEF COMPLAINT  Chief Complaint   Patient presents with   • Ectopic Pregnancy       HPI  Melva Lisa Garcia is a 21 y.o. female who presents with right lower quadrant abdominal pain.  The patient states this started this morning.  She states the pain is sharp.  She was evaluated at [facility] emergency department and found to have a suspected ectopic pregnancy and transferred here for further evaluation.  She has not had any irregular vaginal bleeding or discharge.  She states her last normal menstrual period is on 22 December.  She does have some nausea but no vomiting.  She does not have any change in bowel or bladder habits.  The pain is mild to moderate in intensity with no known exacerbating or relieving factors.    REVIEW OF SYSTEMS  See HPI for further details. All other systems are negative.     PAST MEDICAL HISTORY  No past medical history on file.    FAMILY HISTORY  [unfilled]    SOCIAL HISTORY  Social History     Socioeconomic History   • Marital status: Legally      Spouse name: Not on file   • Number of children: Not on file   • Years of education: Not on file   • Highest education level: Not on file   Occupational History   • Not on file   Tobacco Use   • Smoking status: Never Smoker   • Smokeless tobacco: Never Used   Vaping Use   • Vaping Use: Never used   Substance and Sexual Activity   • Alcohol use: Not Currently     Comment: occcasional - drinks 2-3 days per week sometimes less   • Drug use: Yes     Types: Marijuana, Inhaled     Comment: 2 times a day Marijuana   • Sexual activity: Yes     Partners: Male   Other Topics Concern   • Not on file   Social History Narrative   • Not on file     Social Determinants of Health     Financial Resource Strain:    • Difficulty of Paying Living Expenses: Not on file   Food Insecurity:    • Worried About Running Out of Food in the Last Year: Not on file   • Ran Out of Food in the Last Year: Not on file   Transportation  "Needs:    • Lack of Transportation (Medical): Not on file   • Lack of Transportation (Non-Medical): Not on file   Physical Activity:    • Days of Exercise per Week: Not on file   • Minutes of Exercise per Session: Not on file   Stress:    • Feeling of Stress : Not on file   Social Connections:    • Frequency of Communication with Friends and Family: Not on file   • Frequency of Social Gatherings with Friends and Family: Not on file   • Attends Confucianism Services: Not on file   • Active Member of Clubs or Organizations: Not on file   • Attends Club or Organization Meetings: Not on file   • Marital Status: Not on file   Intimate Partner Violence:    • Fear of Current or Ex-Partner: Not on file   • Emotionally Abused: Not on file   • Physically Abused: Not on file   • Sexually Abused: Not on file   Housing Stability:    • Unable to Pay for Housing in the Last Year: Not on file   • Number of Places Lived in the Last Year: Not on file   • Unstable Housing in the Last Year: Not on file       SURGICAL HISTORY  No past surgical history on file.    CURRENT MEDICATIONS  Home Medications     Reviewed by Stephie Ordonez R.N. (Registered Nurse) on 01/05/22 at 1420  Med List Status: Complete   Medication Last Dose Status   ibuprofen (MOTRIN) 200 MG Tab  Active   valacyclovir (VALTREX) 1 GM Tab 200 Active                ALLERGIES  Allergies   Allergen Reactions   • Other Food Swelling     Truffles, pt reports that her throat swelled up      • Pcn [Penicillins]      Pt reports that it was a childhood allergie       PHYSICAL EXAM  VITAL SIGNS: /69   Pulse 70   Temp 37.6 °C (99.6 °F) (Temporal)   Resp 20   Ht 1.753 m (5' 9\")   Wt 61.2 kg (134 lb 14.7 oz)   LMP 12/22/2021   SpO2 99%   BMI 19.92 kg/m²       Constitutional: Mild acute distress, Non-toxic appearance.   HENT: Normocephalic, Atraumatic, Bilateral external ears normal, Oropharynx moist, No oral exudates, Nose normal.   Eyes: PERRLA, EOMI, Conjunctiva " normal, No discharge.   Neck: Normal range of motion, No tenderness, Supple, No stridor.   Lymphatic: No lymphadenopathy noted.   Cardiovascular: Normal heart rate, Normal rhythm, No murmurs, No rubs, No gallops.   Thorax & Lungs: Normal breath sounds, No respiratory distress, No wheezing, No chest tenderness.   Abdomen: Bowel sounds normal, Soft, right lower quadrant tenderness, No masses, No pulsatile masses.   Skin: Warm, Dry, No erythema, No rash.   Back: No tenderness, No CVA tenderness. .   Extremities: Intact distal pulses, No edema, No tenderness, No cyanosis, No clubbing.    Neurologic: Alert & oriented x 3, Normal motor function, Normal sensory function, No focal deficits noted.   Psychiatric: Affect normal, Judgment normal, Mood normal.     COURSE & MEDICAL DECISION MAKING  Pertinent Labs & Imaging studies reviewed. (See chart for details)  This a 21-year-old female who presents the emergency department as a transfer for an ectopic pregnancy.  I did review her work-up including her Rh factor that is positive urinalysis does not show any significant anemia nor acidosis, and ultrasound the does show evidence of a ectopic pregnancy in the right adnexa.  I spoke with the gynecologist Dr. Butler and he spoke with the patient will take the patient for surgical intervention.    FINAL IMPRESSION  1.  Ectopic pregnancy    Disposition  The patient will be admitted in stable condition         Electronically signed by: Raoul Childress M.D., 1/5/2022 2:30 PM

## 2022-01-05 NOTE — ED PROVIDER NOTES
"ED Provider Note    CHIEF COMPLAINT  Chief Complaint   Patient presents with   • RLQ Pain       HPI  Melva Lisa Garcia is a 21 y.o.  female with LMP 2021 who presents complaining of right lower quadrant pain noted this morning.  She is advised that she had a positive pregnancy test at urgent care and was sent here for further work-up.    Patient states that she had a \"stomach bug\" 10 days ago with diarrhea and some upper respiratory symptoms.  She did not get tested for Covid at that time but recovered after a few days.  Today she awoke with an urge to have a bowel movement.  After she had her bowel movement she continued to have an urge to have a BM and noticed onset of right lower quadrant pain that is sharp and moderate to severe, intermittent.  This is accompanied by nausea.      Patient states her period was lighter and lasted longer than usual.  She had less bleeding and more spotting which lasted for 5 to 7 days rather than her usual 3 to 4-day period with heavier bleeding.    She denies vomiting, fever, chills, urinary symptoms, vaginal discharge/bleeding, history of similar pain.     Pt NPO since 11am when she had water.  No food since last pm.      ALLERGIES  Allergies   Allergen Reactions   • Pcn [Penicillins]        CURRENT MEDICATIONS  Denies current medications  Patient denies OCP use  Patient takes valacyclovir as needed HSV-2 episodes    PAST MEDICAL HISTORY     HSV-2  Dermatitis    SURGICAL HISTORY  patient denies any surgical history    SOCIAL HISTORY  Social History     Tobacco Use   • Smoking status: Never Smoker   • Smokeless tobacco: Never Used   Vaping Use   • Vaping Use: Never used   Substance and Sexual Activity   • Alcohol use: Not Currently     Comment: occcasional    • Drug use: Yes     Types: Marijuana     Comment: 2 times a day    • Sexual activity: Yes     Partners: Male       Family Hx:  Denies      REVIEW OF SYSTEMS  See HPI for further details.  All other systems are " "negative except as above in HPI.    PHYSICAL EXAM  VITAL SIGNS: /83   Pulse 75   Temp 37.6 °C (99.7 °F) (Temporal)   Resp 18   Ht 1.753 m (5' 9\")   Wt 61.2 kg (134 lb 14.7 oz)   LMP 12/22/2021   SpO2 99%   BMI 19.92 kg/m²     General:  WDWN female, nontoxic appearing in NAD; A+Ox3; V/S as above  Skin: warm and dry; good color; no rash  HEENT: NCAT; EOMs intact; PERRL; no scleral icterus   Neck: FROM; soft, supple  Cardiovascular: Regular heart rate and rhythm.  No murmurs, rubs, or gallops; pulses 2+ bilaterally radially and DP areas  Lungs: Clear to auscultation with good air movement bilaterally.  No wheezes, rhonchi, or rales.   Abdomen: BS present; soft; NTND; no rebound, guarding, or rigidity.  No organomegaly or pulsatile mass; no CVAT   Pelvic: Normal external female genitalia; whitish-yellow discharge on exam; no vaginal bleeding; cervix is closed; mild cervical motion tenderness with minimal right adnexal tenderness; no masses palpable  Extremities: CRUZ x 4; no e/o trauma; no pedal edema  Neurologic: CNs III-XII grossly intact; speech clear; distal sensation intact; strength 5/5 UE/LEs  Psychiatric: Appropriate affect, normal mood    LABS  Results for orders placed or performed during the hospital encounter of 01/05/22   CBC WITH DIFFERENTIAL   Result Value Ref Range    WBC 12.0 (H) 4.8 - 10.8 K/uL    RBC 4.20 4.20 - 5.40 M/uL    Hemoglobin 13.6 12.0 - 16.0 g/dL    Hematocrit 40.7 37.0 - 47.0 %    MCV 96.9 81.4 - 97.8 fL    MCH 32.4 27.0 - 33.0 pg    MCHC 33.4 (L) 33.6 - 35.0 g/dL    RDW 43.5 35.9 - 50.0 fL    Platelet Count 204 164 - 446 K/uL    MPV 11.8 9.0 - 12.9 fL    Neutrophils-Polys 86.50 (H) 44.00 - 72.00 %    Lymphocytes 8.50 (L) 22.00 - 41.00 %    Monocytes 4.30 0.00 - 13.40 %    Eosinophils 0.10 0.00 - 6.90 %    Basophils 0.20 0.00 - 1.80 %    Immature Granulocytes 0.40 0.00 - 0.90 %    Nucleated RBC 0.00 /100 WBC    Neutrophils (Absolute) 10.34 (H) 2.00 - 7.15 K/uL    Lymphs " (Absolute) 1.02 1.00 - 4.80 K/uL    Monos (Absolute) 0.52 0.00 - 0.85 K/uL    Eos (Absolute) 0.01 0.00 - 0.51 K/uL    Baso (Absolute) 0.02 0.00 - 0.12 K/uL    Immature Granulocytes (abs) 0.05 0.00 - 0.11 K/uL    NRBC (Absolute) 0.00 K/uL   COMP METABOLIC PANEL   Result Value Ref Range    Sodium 139 135 - 145 mmol/L    Potassium 3.8 3.6 - 5.5 mmol/L    Chloride 105 96 - 112 mmol/L    Co2 21 20 - 33 mmol/L    Anion Gap 13.0 7.0 - 16.0    Glucose 102 (H) 65 - 99 mg/dL    Bun 7 (L) 8 - 22 mg/dL    Creatinine 0.66 0.50 - 1.40 mg/dL    Calcium 9.2 8.4 - 10.2 mg/dL    AST(SGOT) 14 12 - 45 U/L    ALT(SGPT) 10 2 - 50 U/L    Alkaline Phosphatase 60 30 - 99 U/L    Total Bilirubin 0.3 0.1 - 1.5 mg/dL    Albumin 4.6 3.2 - 4.9 g/dL    Total Protein 7.2 6.0 - 8.2 g/dL    Globulin 2.6 1.9 - 3.5 g/dL    A-G Ratio 1.8 g/dL   LIPASE   Result Value Ref Range    Lipase 19 7 - 58 U/L   HCG QUANTITATIVE SERUM   Result Value Ref Range    Bhcg 617.1 (H) 0.0 - 10.0 mIU/mL   RH TYPE FOR RHOGAM FROM E.D.   Result Value Ref Range    Emergency Department Rh Typing POS     Number Of Rh Doses Indicated ZERO    CHLAMYDIA & GC BY PCR    Specimen: Genital   Result Value Ref Range    Source Vaginal    ESTIMATED GFR   Result Value Ref Range    GFR If African American >60 >60 mL/min/1.73 m 2    GFR If Non African American >60 >60 mL/min/1.73 m 2         IMAGING  US-OB 1ST TRIMESTER WITH TRANSVAGINAL (COMBO)   Final Result      Right adnexal mass interposed between the uterus and normal-appearing right ovary is very worrisome for an ectopic pregnancy. There is echogenic adjacent material worrisome for hemorrhage      No intrauterine gestation is identified      Normal sonographic appearance of the ovaries      Findings were discussed with CRISTINA HODGE on 1/5/2022 1:00 PM.            MEDICAL RECORD  I have reviewed patient's medical record and pertinent results are listed below.      COURSE & MEDICAL DECISION MAKING  I have reviewed any medical  record information, laboratory studies and radiographic results as noted.    Melva Garcia is a 21 y.o. female who presents complaining of right lower quadrant pain in the setting of a newly known positive pregnancy test.  Ectopic pregnancy is a chief concern.  Patient is currently hemodynamically stable without tachycardia or hypotension.  She is afebrile.  We also considered UTI, renal colic, appendicitis, PID, ovarian cyst, ovarian torsion, TOA, miscarriage.    Appropriate PPE was worn at all times while interacting with the patient.    White blood cell count elevated to 12.  Hemoglobin is normal at 13.6.  Beta quant is 617.1.  Patient is Rh+.  Pelvic swabs are pending.    Pelvic ultrasound results were reported to Dr. Carver by the radiologist while I was in the patient's room.  He advised me the findings were concerning for an ectopic pregnancy.    1:10 PM  Paging GYN and arranging transfer now after receiving report of ectopic, advising patient, and performing pelvic exam.    1:16 PM  I discussed the case with Dr. Childress at [facility] ER who accepts the patient in transfer.     1:45 PM  I discussed the case with Dr. Barriga from GYN who is aware that the patient will be transferred from [facility] to [facility].  We discussed the details of the case.  Covid dry swab obtained prior to transfer.    FINAL IMPRESSION  1. RLQ abdominal pain     2. Adnexal mass              Electronically signed by: Jeanne Medeiros M.D., 1/5/2022 12:09 PM

## 2022-01-05 NOTE — ED NOTES
Pt and SO have decided to take ambulance to [facility] for further care and treatment of her medical Dx VSS at present

## 2022-01-06 LAB — PATHOLOGY CONSULT NOTE: NORMAL

## 2022-01-06 NOTE — OR NURSING
Arrived to Phase II after report from JIA Turpin    VSS, denies nausea, reports pain tolerable. Ambulated from gurney to chair with standby assist.    Surgical site CDI to abdomen    Alarms on and set to appropriate parameters. Call light within reach, rounding in place.

## 2022-01-06 NOTE — OR NURSING
Pt's VSS; denies N/V; states pain is at tolerable level. D/c orders received. IV dc'd. Pt changed into clothing with assistance. Pt up and ambulated to BR, steady gait, voided adequately. Discharge instructions given as well as pain management handout; pt and family verbalized understanding and questions answered. Patient states ready to d/c home. No prescriptions given. Pt dc'd in w/c with boyfriend in stable condition.

## 2022-01-06 NOTE — DISCHARGE INSTRUCTIONS
ACTIVITY: Rest and take it easy for the first 24 hours.  A responsible adult is recommended to remain with you during that time.  It is normal to feel sleepy.  We encourage you to not do anything that requires balance, judgment or coordination.    MILD FLU-LIKE SYMPTOMS ARE NORMAL. YOU MAY EXPERIENCE GENERALIZED MUSCLE ACHES, THROAT IRRITATION, HEADACHE AND/OR SOME NAUSEA.    FOR 24 HOURS DO NOT:  Drive, operate machinery or run household appliances.  Drink beer or alcoholic beverages.   Make important decisions or sign legal documents.    SPECIAL INSTRUCTIONS:     Post-op: What to expect after your surgery    For urgent post-operative questions or concerns, please call Dr. Barriga's direct on-call cell line at [phone].     For less-urgent matters (Monday - Friday), you may send a message through [employer] or call the general Women's Health line at [phone].    Bladder function  Try to empty your bladder (urinate) at regular intervals by sitting on the toilet and relaxing.  You may need to adjust your positioning (lean forward or back) to empty the bladder fully. It is important that you do not push or strain to empty your bladder.     Call the surgeon at the number above if you cannot urinate. Also, call for treatment if you have signs and symptoms of a urinary tract infection, including:   • Burning with urination  • Bladder pain  • Worsening need to urinate right away  • Urine with a bad smell    If you are sent home with a catheter:   Empty the catheter bag when it becomes full. The bag should be kept at a level below your hips to drain properly. When you are asleep, the bag should dangle off the bed. and should dangle off of the bed while you are asleep. You will be called the day after you go home to schedule an office visit to test your bladder and remove the catheter.     Vaginal care:   Do not go swimming, take sitting baths, or have sexual intercourse for 6 weeks after surgery. Do not place anything in  the vagina except vaginal estrogen cream, if instructed to do so by your surgeon.     Vaginal discharge and bleeding/spotting is also normal through the entire 6-week recovery. Contact the office with any heavy bleeding soaking through pads, bad-smelling discharge, or worsening pain.     Pain management:  Surgical pain is controlled in most patients with only non-steroidal anti-inflammatory drugs (NSAIDs, such as ibuprofen, “Advil”), and acetaminophen (Tylenol). These drugs can be taken together without interaction. In the hospital, your nurse will give you these medications at regular intervals to both treat and to prevent pain. If these are not controlling your pain, you may ask the nurse for additional medication. When you go home, you will also take NSAIDs and acetaminophen for pain management. Sometimes, a short course of narcotics such as oxycodone and hydromorphone is are required. We do not recommend using narcotics regularly as it can lead to constipation or dizziness, and falls.     Do not drive or operate heavy machinery while using narcotics. You are unlikely to become addicted if you need to take a narcotic medication a few times within the first week of your surgery.  After the first few days to one week, your pain should decrease and you should not have pain severe enough to need narcotics. If you continue having severe pain, contact your surgeon for re-evaluation.     Abdominal wound care  The incisions on your abdomen will be closed with either small bandages or a surgical glue. There are also tiny dissolving sutures beneath the skin. You can shower with these in place. In the shower, let the soapy water run over your incisions. Do not scrub or wipe your incisions. Keep the incisions dry for the remainder of the day/night. The bandages will fall off on their own, or you can remove them after at least 3 days if they become discolored or dirty. The glue will also fall off on its own after a few weeks.  Small sutures that pop out through the skin are normal and will dissolve over time.    Call us if you feel increasing pain, redness, discharge or warmth at the incision.     Bowel function  Constipation is common after surgery. This means it may take several days before having a normal bowel movement. It is important to take extra steps to keep your stools soft to avoid straining with bowel movements. Straining may damage the prolapse repair before it has healed. Call us if you experience any repeated episodes of vomiting, worsening abdominal pain/bloating, or are unable to have a bowel movement for more than 3 days.     Activity restrictions  During the first 6 weeks avoid most heavy lifting that requires you to strain.  Gentle walking is good exercise. Start with about 10 minutes a day when you feel ready and build up gradually. Avoid repetitive squatting or bending at the waist.Avoid any fitness-type training, aerobics, etc. for at least 6 weeks after surgery. Generally, you will need 1-2 weeks off work. This period may be longer if you have a very physical job.      DIET: To avoid nausea, slowly advance diet as tolerated, avoiding spicy or greasy foods for the first day.  Add more substantial food to your diet according to your physician's instructions.  Babies can be fed formula or breast milk as soon as they are hungry.  INCREASE FLUIDS AND FIBER TO AVOID CONSTIPATION.      FOLLOW-UP APPOINTMENT:  A follow-up appointment should be arranged with your doctor in 1-2 weeks; call to schedule.    You should CALL YOUR PHYSICIAN if you develop:  Fever greater than 101 degrees F.  Pain not relieved by medication, or persistent nausea or vomiting.  Excessive bleeding (blood soaking through dressing) or unexpected drainage from the wound.  Extreme redness or swelling around the incision site, drainage of pus or foul smelling drainage.  Inability to urinate or empty your bladder within 8 hours.  Problems with breathing or  chest pain.    You should call 911 if you develop problems with breathing or chest pain.  If you are unable to contact your doctor or surgical center, you should go to the nearest emergency room or urgent care center.  Physician's telephone #: [phone] Dr Barriga    If any questions arise, call your doctor.  If your doctor is not available, please feel free to call the Surgical Center at [phone].     A registered nurse may call you a few days after your surgery to see how you are doing after your procedure.    MEDICATIONS: Resume taking daily medication.  Take prescribed pain medication with food.  If no medication is prescribed, you may take non-aspirin pain medication if needed.  PAIN MEDICATION CAN BE VERY CONSTIPATING.  Take a stool softener or laxative such as senokot, pericolace, or milk of magnesia if needed.    Last pain medication given at 6 pm.      Depression / Suicide Risk    As you are discharged from this [facility] facility, it is important to learn how to keep safe from harming yourself.    Recognize the warning signs:  · Abrupt changes in personality, positive or negative- including increase in energy   · Giving away possessions  · Change in eating patterns- significant weight changes-  positive or negative  · Change in sleeping patterns- unable to sleep or sleeping all the time   · Unwillingness or inability to communicate  · Depression  · Unusual sadness, discouragement and loneliness  · Talk of wanting to die  · Neglect of personal appearance   · Rebelliousness- reckless behavior  · Withdrawal from people/activities they love  · Confusion- inability to concentrate     If you or a loved one observes any of these behaviors or has concerns about self-harm, here's what you can do:  · Talk about it- your feelings and reasons for harming yourself  · Remove any means that you might use to hurt yourself (examples: pills, rope, extension cords, firearm)  · Get professional help from the  community (Mental Health, Substance Abuse, psychological counseling)  · Do not be alone:Call your Safe Contact- someone whom you trust who will be there for you.  · Call your local CRISIS HOTLINE 655-3447 or 104-431-3562  · Call your local Children's Mobile Crisis Response Team [place] (256) 944-4518 or www.[employer]  · Call the toll free National Suicide Prevention Hotlines   · National Suicide Prevention Lifeline 743-532-NULM (4909)  · National Hope Line Network 800-SUICIDE (314-4427)

## 2022-01-06 NOTE — ANESTHESIA PREPROCEDURE EVALUATION
Case: [id number] Date/Time: 01/05/22 1623    Procedure: PELVISCOPY - ectopic    Location: [facility] OR 17 / SURGERY [facility]    Surgeons: Murray Barriga M.D.        Healthy 21yof here with an Ectopic. NPO per guidelines.     Relevant Problems   No relevant active problems       Physical Exam    Airway   Mallampati: II  TM distance: >3 FB  Neck ROM: full       Cardiovascular - normal exam  Rhythm: regular  Rate: normal  (-) murmur     Dental - normal exam           Pulmonary - normal exam  Breath sounds clear to auscultation     Abdominal    Neurological - normal exam                 Anesthesia Plan    ASA 1- EMERGENT   ASA physical status emergent criteria: compromised vital organ, limb or tissue    Plan - general       Airway plan will be ETT          Induction: intravenous    Postoperative Plan: Postoperative administration of opioids is intended.    Pertinent diagnostic labs and testing reviewed    Informed Consent:    Anesthetic plan and risks discussed with patient.    Use of blood products discussed with: patient whom consented to blood products.

## 2022-01-06 NOTE — ANESTHESIA PROCEDURE NOTES
Airway    Date/Time: 1/5/2022 4:45 PM  Performed by: Matteo Ramirez M.D.  Authorized by: Matteo Ramirez M.D.     Location:  OR  Urgency:  Elective  Indications for Airway Management:  Anesthesia      Spontaneous Ventilation: absent    Sedation Level:  Deep  Preoxygenated: Yes    Patient Position:  Sniffing  Final Airway Type:  Endotracheal airway  Final Endotracheal Airway:  ETT  Cuffed: Yes    Technique Used for Successful ETT Placement:  Direct laryngoscopy    Insertion Site:  Oral  Blade Type:  [place]  Laryngoscope Blade/Videolaryngoscope Blade Size:  3  ETT Size (mm):  6.5  Measured from:  Teeth  ETT to Teeth (cm):  21  Placement Verified by: auscultation and capnometry    Cormack-Lehane Classification:  Grade I - full view of glottis  Number of Attempts at Approach:  1

## 2022-01-06 NOTE — OP REPORT
OPERATIVE REPORT     Name: Melva Garcia    : 2000    MRN: [id number]       PRE-OP DIAGNOSIS:   1. Suspected ruptured ectopic pregnancy  2. Pregnancy of unknown location            POST-OP DIAGNOSIS:   1. Ruptured right tubal ectopic pregnancy            PROCEDURE:   1. Diagnostic Laparoscopy  2. Laparoscopic right salpingectomy            SURGEON:   Surgeon(s) and Role:     * Murray Barriga M.D. - Primary            ANESTHESIA: General            FINDINGS:      No laparoscopic entry injuries to viscera or vasculature. Normal appearing peritoneal cavity and liver edge without significant adhesions. Normal-appearing uterus, left fallopian tubes and bilateral ovaries. Significantly dilated right fallopian tube with attached clot and approximately 120mL hemoperitoneum upon entry. Normal survey prior to closure       ESTIMATED BLOOD LOSS: 120mL (pre-op), minimal EBL intra-op            DRAINS: none                SPECIMENS: Right fallopian tube with ectopic pregnancy            IMPLANTS: none            COMPLICATIONS: none            DISPOSITION: discharge            CONDITION: Stable            INDICATION FOR SURGERY:     The patient is a 21-year-old  who presented with significant right pelvic/abdominal pain and was found on work-up to have a high suspicion for a right ectopic pregnancy (unintended but desired pregnancy) with concern for rupture.  Due to her symptoms, beta hCG level, absence of IUP, intraperitoneal blood, desired pregnancy, and right adnexal mass distinct from ovary, I recommended diagnostic laparoscopy over observation or methotrexate therapy.  She is counseled the risk benefits and alternatives of diagnostic laparoscopy and possible salpingectomy including bleeding, infection, damage to surrounding organs, possible transfusion, possible laparotomy. Consent was signed and witnessed and all questions were answered.       TECHNIQUE:       She was brought to the operative suite where  general anesthesia was successfully administered.  She was placed in the dorsolithotomy position in Carlos fin stirrups with all pressure points padded.  She was prepped and draped in the usual sterile fashion for both abdominal and pelvic surgery.  An exam under anesthesia was then performed with the above findings noted. She had urinated prior to induction and bladder was note drained.     Attention was then turned to the abdomen.  In the inferior umbilicus a 5 mm incision was made after infiltration of 0.25% Marcaine with epinephrine.  The infraumbilical fascia was grasped with a Kocher clamp and elevated, and a Veress needle was then placed into the peritoneal cavity, with correct placement confirmed using hanging drop method and with low opening pressure of 2 mmHg.  Pneumoperitoneum was then established to 15 mmHg.  A 5 mm laparoscope was then introduced with a 5 mm Optiview trocar under direct visualization into the peritoneal cavity.  A full survey of the peritoneal cavity did not reveal any entry injuries and with the findings noted above.  Attention was then turned to the right pelvis 2 finerbreadths superomedial to the ASIS, where under direct laparoscopic [id number] a 5mm trocar was advanced to the peritoneal cavity.  Superior to this and somewhat medial, approximately 2 fingerbreadths inferior to the costal margin additional 5 mm laparoscopic port was placed.  Hemoperitoneum was then evacuated using suction irrigation.  Attention then turned to the right fallopian tube with noted clot and active bleeding.  The tube was elevated and dissected away from the underlying mesosalpinx using the LigaSure the level of the cornua with care to avoid ovarian vessels.  Excellent hemostasis of the dissection site was noted low pressures.  A final survey was performed and everything was found to be hemostatic.  There was blood which had collected at the right hemidiaphragm was suctioned and irrigated away.  The  entier peritoneal cavity was irrigated and suctioned of all blood. Trochars were removed under visualization and pneumoperitoneum was allowed to escape. Skin incisions were closed with 4-0 Monocryl subcuticularly followed by Dermabond.  Excellent hemostasis was once again noted.     All counts are correct x2.  The patient was awakened from general anesthesia successfully and brought to the postanesthesia care unit in stable condition.  Directly spoke with the patient's boyfriend, reviewed images, and postoperative expectations.  She will follow up with me in 2 weeks for postoperative check.        Murray Barriga MD, FACOG  Female Pelvic Medicine and Reconstructive Surgery  Department of Obstetrics and Gynecology  [facility]

## 2022-01-06 NOTE — ANESTHESIA POSTPROCEDURE EVALUATION
Patient: Melva Garcia    Procedure Summary     Date: 01/05/22 Room / Location: [place] [address] / SURGERY [facility]    Anesthesia Start: 1639 Anesthesia Stop: 1748    Procedures:       Diagnostic LAPAROSCOPY (Right Abdomen)      SALPINGECTOMY (Right Abdomen) Diagnosis: (Right Ectopic Pregnancy)    Surgeons: Murray Barriga M.D. Responsible Provider: Matteo Ramirez M.D.    Anesthesia Type: general ASA Status: 1 - Emergent          Final Anesthesia Type: general  Last vitals  BP   Blood Pressure: 115/75    Temp   36.6 °C (97.8 °F)    Pulse   69   Resp   18    SpO2   99 %      Anesthesia Post Evaluation    Patient location during evaluation: PACU  Patient participation: complete - patient participated  Level of consciousness: awake and alert    Airway patency: patent  Anesthetic complications: no  Cardiovascular status: hemodynamically stable  Respiratory status: acceptable  Hydration status: euvolemic    PONV: none          No complications documented.     Nurse Pain Score: 0 (NPRS)

## 2022-01-11 ENCOUNTER — APPOINTMENT (OUTPATIENT)
Dept: URGENT CARE | Facility: CLINIC | Age: 22
End: 2022-01-11
Payer: COMMERCIAL

## 2022-01-20 ENCOUNTER — GYNECOLOGY VISIT (OUTPATIENT)
Dept: OBGYN | Facility: CLINIC | Age: 22
End: 2022-01-20
Payer: COMMERCIAL

## 2022-01-20 VITALS
HEART RATE: 64 BPM | SYSTOLIC BLOOD PRESSURE: 108 MMHG | WEIGHT: 131 LBS | DIASTOLIC BLOOD PRESSURE: 74 MMHG | BODY MASS INDEX: 19.35 KG/M2

## 2022-01-20 DIAGNOSIS — Z98.890 POST-OPERATIVE STATE: Primary | ICD-10-CM

## 2022-01-20 DIAGNOSIS — O00.101 RIGHT TUBAL PREGNANCY WITHOUT INTRAUTERINE PREGNANCY: ICD-10-CM

## 2022-01-20 PROCEDURE — 99024 POSTOP FOLLOW-UP VISIT: CPT | Performed by: STUDENT IN AN ORGANIZED HEALTH CARE EDUCATION/TRAINING PROGRAM

## 2022-01-20 ASSESSMENT — FIBROSIS 4 INDEX: FIB4 SCORE: 0.5

## 2022-01-20 NOTE — NON-PROVIDER
PT here today for Post Op per  ; Ectopic pregnancy   States getting better by the day but still feeling fatigued   Good #: [phone]   Pharmacy Verified

## 2022-01-20 NOTE — PROGRESS NOTES
Urogynecology and Pelvic Reconstructive Surgery - Post-operative Visit    Melva Garcia  2000  [id number]    She is now POD15 s/p right salpingectomy for ruptured ectopic. .     She is doing well. Her pain is controlled, tolerating regular diet without n/v, voiding spontaneously, minimal vaginal bleeding.     She reports having a UTI last week, seen at urgent care, completed antibiotics    No past medical history on file.  Past Surgical History:   Procedure Laterality Date   • WA LAP,DIAGNOSTIC ABDOMEN Right 1/5/2022    Procedure: Diagnostic LAPAROSCOPY;  Surgeon: Murray Barriga M.D.;  Location: SURGERY [facility];  Service: Gynecology   • SALPINGECTOMY Right 1/5/2022    Procedure: SALPINGECTOMY;  Surgeon: Murray Barriga M.D.;  Location: SURGERY [facility];  Service: Gynecology       Current Outpatient Medications:   •  ibuprofen (MOTRIN) 400 MG Tab, Take 800mg every 8 hours for the first 7 days after your surgery. After 7 days, you may take 400mg every 4-8 hours as needed for pain., Disp: 60 Tablet, Rfl: 1  •  acetaminophen (TYLENOL) 500 MG Tab, Take 1000mg (2 tab) three times daily for the first 7 days after surgery. After 7 days, you may take 500mg (1 tab) every 4 hours as needed for pain., Disp: 60 Tablet, Rfl: 1  •  valacyclovir (VALTREX) 1 GM Tab, Take 1,000 mg by mouth every evening., Disp: , Rfl:         Objective :   Vitals:    01/20/22 0907   BP: 108/74   BP Location: Left arm   Patient Position: Sitting   BP Cuff Size: Adult   Pulse: 64   Weight: 59.4 kg (131 lb)       Chest: No distress  Abdomen: soft, nontender, nondistended  Incision: laparoscopic incisions clean, dry, intact without induration, erythema or discharge.       Assessment/Plan:   POD#  15 s/p laparoscopic right salpingectomy for ruptured ectopic. Meeting post-operative milestones  -She may return to normal activities as tolerated  -Counseled on the pattern and duration of her periods after this might be irregular or heavier than  usual.  She had regular periods before this procedure and she should return to regular periods afterwards as well  -She does not currently desire pregnancy at this time.  She was thoroughly counseled on options for contraception, however she has had issues with OCPs previously.  She understands the higher risk of pregnancy while sexually active and only using the barrier method and like to continue without contraception at this time.  I did briefly review the options of IUD, Nexplanon, different dosing regimens of combined oral contraception.  She was counseled that if she does desire further contraception she can make an appointment, with one of the generalist OB/GYN's here at our practice.  -She had questions about future ectopic risk.  I did explain to her that given the history of an ectopic she is at higher risk than women who have not had a prior ectopic.  However, overall risk of ectopic is low compared to normal pregnancy.  -She may send me a chart message if she has any recurrent UTI symptoms.      Murray Barriga MD, FACOG    Female Pelvic Medicine and Reconstructive Surgery  Department of Obstetrics and Gynecology  [facility]  [facility]

## 2023-07-20 ENCOUNTER — HOSPITAL ENCOUNTER (OUTPATIENT)
Dept: RADIOLOGY | Facility: MEDICAL CENTER | Age: 23
End: 2023-07-20
Attending: PHYSICIAN ASSISTANT
Payer: COMMERCIAL

## 2023-07-20 ENCOUNTER — OFFICE VISIT (OUTPATIENT)
Dept: URGENT CARE | Facility: CLINIC | Age: 23
End: 2023-07-20
Payer: COMMERCIAL

## 2023-07-20 ENCOUNTER — HOSPITAL ENCOUNTER (OUTPATIENT)
Facility: MEDICAL CENTER | Age: 23
End: 2023-07-20
Attending: PHYSICIAN ASSISTANT
Payer: COMMERCIAL

## 2023-07-20 VITALS
RESPIRATION RATE: 14 BRPM | HEART RATE: 94 BPM | WEIGHT: 145 LBS | TEMPERATURE: 98.6 F | HEIGHT: 69 IN | BODY MASS INDEX: 21.48 KG/M2 | DIASTOLIC BLOOD PRESSURE: 80 MMHG | SYSTOLIC BLOOD PRESSURE: 124 MMHG | OXYGEN SATURATION: 100 %

## 2023-07-20 DIAGNOSIS — N76.0 BV (BACTERIAL VAGINOSIS): ICD-10-CM

## 2023-07-20 DIAGNOSIS — R82.998 LEUKOCYTES IN URINE: ICD-10-CM

## 2023-07-20 DIAGNOSIS — R10.2 SUPRAPUBIC DISCOMFORT: ICD-10-CM

## 2023-07-20 DIAGNOSIS — B96.89 BV (BACTERIAL VAGINOSIS): ICD-10-CM

## 2023-07-20 DIAGNOSIS — N93.8 DUB (DYSFUNCTIONAL UTERINE BLEEDING): ICD-10-CM

## 2023-07-20 LAB
APPEARANCE UR: NORMAL
BILIRUB UR STRIP-MCNC: NORMAL MG/DL
CANDIDA DNA VAG QL PROBE+SIG AMP: NEGATIVE
COLOR UR AUTO: YELLOW
G VAGINALIS DNA VAG QL PROBE+SIG AMP: POSITIVE
GLUCOSE UR STRIP.AUTO-MCNC: NORMAL MG/DL
KETONES UR STRIP.AUTO-MCNC: NORMAL MG/DL
LEUKOCYTE ESTERASE UR QL STRIP.AUTO: NORMAL
NITRITE UR QL STRIP.AUTO: NORMAL
PH UR STRIP.AUTO: 6.5 [PH] (ref 5–8)
POCT INT CON NEG: NEGATIVE
POCT INT CON POS: POSITIVE
POCT URINE PREGNANCY TEST: NEGATIVE
PROT UR QL STRIP: NORMAL MG/DL
RBC UR QL AUTO: NORMAL
SP GR UR STRIP.AUTO: 1.01
T VAGINALIS DNA VAG QL PROBE+SIG AMP: NEGATIVE
UROBILINOGEN UR STRIP-MCNC: 0.2 MG/DL

## 2023-07-20 PROCEDURE — 87086 URINE CULTURE/COLONY COUNT: CPT

## 2023-07-20 PROCEDURE — 87660 TRICHOMONAS VAGIN DIR PROBE: CPT

## 2023-07-20 PROCEDURE — 87480 CANDIDA DNA DIR PROBE: CPT

## 2023-07-20 PROCEDURE — 87510 GARDNER VAG DNA DIR PROBE: CPT

## 2023-07-20 PROCEDURE — 87591 N.GONORRHOEAE DNA AMP PROB: CPT

## 2023-07-20 PROCEDURE — 3074F SYST BP LT 130 MM HG: CPT | Performed by: PHYSICIAN ASSISTANT

## 2023-07-20 PROCEDURE — 3079F DIAST BP 80-89 MM HG: CPT | Performed by: PHYSICIAN ASSISTANT

## 2023-07-20 PROCEDURE — 81025 URINE PREGNANCY TEST: CPT | Performed by: PHYSICIAN ASSISTANT

## 2023-07-20 PROCEDURE — 99215 OFFICE O/P EST HI 40 MIN: CPT | Performed by: PHYSICIAN ASSISTANT

## 2023-07-20 PROCEDURE — 76830 TRANSVAGINAL US NON-OB: CPT

## 2023-07-20 PROCEDURE — 81002 URINALYSIS NONAUTO W/O SCOPE: CPT | Performed by: PHYSICIAN ASSISTANT

## 2023-07-20 PROCEDURE — 87491 CHLMYD TRACH DNA AMP PROBE: CPT

## 2023-07-20 RX ORDER — NORGESTIMATE AND ETHINYL ESTRADIOL 0.25-0.035
KIT ORAL
COMMUNITY
Start: 2023-06-24

## 2023-07-20 RX ORDER — NITROFURANTOIN 25; 75 MG/1; MG/1
100 CAPSULE ORAL 2 TIMES DAILY
Qty: 10 CAPSULE | Refills: 0 | Status: SHIPPED | OUTPATIENT
Start: 2023-07-20 | End: 2023-07-25

## 2023-07-20 RX ORDER — METRONIDAZOLE 500 MG/1
500 TABLET ORAL 2 TIMES DAILY
Qty: 14 TABLET | Refills: 0 | Status: SHIPPED | OUTPATIENT
Start: 2023-07-20 | End: 2023-07-27

## 2023-07-20 RX ORDER — PHENAZOPYRIDINE HYDROCHLORIDE 95 MG/1
95 TABLET ORAL
COMMUNITY
End: 2023-07-27

## 2023-07-20 RX ORDER — VALACYCLOVIR HYDROCHLORIDE 500 MG/1
500 TABLET, FILM COATED ORAL
COMMUNITY
End: 2023-07-27

## 2023-07-20 ASSESSMENT — ENCOUNTER SYMPTOMS
FLANK PAIN: 0
VOMITING: 0
FEVER: 0
NAUSEA: 1
CHILLS: 0
ABDOMINAL PAIN: 1
CONSTIPATION: 0
BLOOD IN STOOL: 0
DIARRHEA: 0

## 2023-07-20 ASSESSMENT — FIBROSIS 4 INDEX: FIB4 SCORE: 0.54

## 2023-07-20 NOTE — PROGRESS NOTES
Subjective:   Melva Garcia  is a 23 y.o. female who presents for Abdominal Pain (Above pelvic area, Monday, no fever, no chills, ), Nausea (Sunday, vomited once , no diarrhea, ), and Vaginal Bleeding (X 3 weeks, light, cramping, Neg pregnancy at home x 2 days, )      Abdominal Pain  Associated symptoms include nausea. Pertinent negatives include no constipation, diarrhea, dysuria, fever, frequency, hematuria, melena or vomiting (resolved).   Nausea  Associated symptoms include abdominal pain (mild suprapubic) and nausea. Pertinent negatives include no chills, fever, rash or vomiting (resolved).   Patient presents urgent care noting last few days of waxing and waning suprapubic pain.  Notes pain has increased and is present both while sitting while lying down.  Patient has a past medical history of ectopic pregnancy and salpingectomy.  She denies dysuria frequency urgency or hematuria.  She denies fevers chills.  She did have an episode of nausea with vomiting after eating some elk 4 days ago.  She has had some mild nausea since.  She denies diarrhea but noted some green-colored stool over the last 24 hours.  Denies constipation.  Patient denies a history of other abdominal surgeries.  Denies abnormal vaginal discharge.  Denies concern for STI.  She has had slight abnormal vaginal bleeding for the last 3 weeks and last normal menstrual period was in late May.  She has had a few isolated episodes of brief dizziness but otherwise denies visual changes feeling lightheaded or dizziness associated with persistent menstrual bleeding.  She notes bleeding has been light in nature.  Patient does have a gynecologist but has not yet reached out to her.    Review of Systems   Constitutional:  Negative for chills and fever.   Gastrointestinal:  Positive for abdominal pain (mild suprapubic) and nausea. Negative for blood in stool, constipation, diarrhea, melena and vomiting (resolved).   Genitourinary:  Negative for  "dysuria, flank pain, frequency, hematuria and urgency.        Abnormal vaginal bleeding   Skin:  Negative for rash.       Allergies   Allergen Reactions    Other Food Swelling     Truffles, pt reports that her throat swelled up       Pcn [Penicillins]      Pt reports that it was a childhood allergie        Objective:   /80 (BP Location: Right arm, Patient Position: Sitting, BP Cuff Size: Adult)   Pulse 94   Temp 37 °C (98.6 °F) (Temporal)   Resp 14   Ht 1.753 m (5' 9\")   Wt 65.8 kg (145 lb)   LMP 05/29/2023 (Approximate)   SpO2 100%   BMI 21.41 kg/m²     Physical Exam  Vitals and nursing note reviewed.   Constitutional:       General: She is not in acute distress.     Appearance: She is well-developed. She is not diaphoretic.   HENT:      Head: Normocephalic and atraumatic.      Right Ear: External ear normal.      Left Ear: External ear normal.      Nose: Nose normal.   Eyes:      General: Lids are normal. No scleral icterus.        Right eye: No discharge.         Left eye: No discharge.      Conjunctiva/sclera: Conjunctivae normal.   Pulmonary:      Effort: Pulmonary effort is normal. No respiratory distress.   Abdominal:      General: Abdomen is flat. Bowel sounds are normal.      Palpations: Abdomen is soft.      Tenderness: There is abdominal tenderness (no guarding) in the suprapubic area. There is no right CVA tenderness, left CVA tenderness, guarding or rebound. Negative signs include Miles's sign, Rovsing's sign and McBurney's sign.      Comments: Nonacute abdomen with patient endorsing mild midline suprapubic tenderness, no guarding, no rebound, no CVAT B   Musculoskeletal:         General: Normal range of motion.      Cervical back: Neck supple.   Skin:     General: Skin is warm and dry.      Coloration: Skin is not pale.      Findings: No erythema.   Neurological:      Mental Status: She is alert and oriented to person, place, and time. She is not disoriented.   Psychiatric:         " Speech: Speech normal.         Behavior: Behavior normal.     Results for orders placed or performed in visit on 07/20/23   POCT Urinalysis   Result Value Ref Range    POC Color yellow Negative    POC Appearance slight cloudy Negative    POC Glucose neg Negative mg/dL    POC Bilirubin neg Negative mg/dL    POC Ketones neg Negative mg/dL    POC Specific Gravity 1.015 <1.005 - >1.030    POC Blood small Negative    POC Urine PH 6.5 5.0 - 8.0    POC Protein neg Negative mg/dL    POC Urobiligen 0.2 Negative (0.2) mg/dL    POC Nitrites neg Negative    POC Leukocyte Esterase small Negative   POCT PREGNANCY   Result Value Ref Range    POC Urine Pregnancy Test Negative     Internal Control Positive Positive     Internal Control Negative Negative      Vaginal pathogen swab has resulted as positive for Gardnerella, negative for trichomoniasis and yeast vaginitis.  This was reviewed with patient by phone on 7/20/2023.    US PELVIC -   FINDINGS:  Both transabdominal and transvaginal scanning were performed to optimally visualize the pelvis.     UTERUS:  The uterus measures 4.60 cm x 8.18 cm x 5.58 cm.  The uterine myometrium is within normal limits.  The endometrial echo complex measures 0.59 cm.  The endometrium is unremarkable in appearance and thickness for age and menstrual status.     OVARIES:  The right ovary measures 3.08 cm x 1.65 cm x 2.10 cm. Duplex Doppler examination of the right ovary shows normal waveforms.     The left ovary measures 2.94 cm x 1.46 cm x 1.51 cm. Duplex Doppler examination of the left ovary shows normal waveforms.     There is no free fluid seen.     IMPRESSION:     Unremarkable pelvic ultrasound.           Exam Ended: 07/20/23  5:41 PM Last Resulted: 07/20/23  5:44 PM              Results of ultrasound reviewed with patient on 7/20/2023.    Negative for gonorrhea and chlamydia    Urine culture negative    CBC with normal hemoglobin and hematocrit      Assessment/Plan:   1. Suprapubic discomfort  -  POCT Urinalysis  - POCT PREGNANCY  - nitrofurantoin (MACROBID) 100 MG Cap; Take 1 Capsule by mouth 2 times a day for 5 days.  Dispense: 10 Capsule; Refill: 0  - URINE CULTURE(NEW); Future  - CBC WITH DIFFERENTIAL; Future  - US-PELVIC COMPLETE (TRANSABDOMINAL/TRANSVAGINAL) (COMBO); Future    2. DUB (dysfunctional uterine bleeding)  - CBC WITH DIFFERENTIAL; Future  - US-PELVIC COMPLETE (TRANSABDOMINAL/TRANSVAGINAL) (COMBO); Future    3. Leukocytes in urine  - nitrofurantoin (MACROBID) 100 MG Cap; Take 1 Capsule by mouth 2 times a day for 5 days.  Dispense: 10 Capsule; Refill: 0  - URINE CULTURE(NEW); Future  - Chlamydia/GC, PCR (Genital/Anal swab); Future  - VAGINAL PATHOGENS DNA PANEL; Future    4. BV (bacterial vaginosis)  - metroNIDAZOLE (FLAGYL) 500 MG Tab; Take 1 Tablet by mouth 2 times a day for 7 days.  Dispense: 14 Tablet; Refill: 0    Other orders  - ESTARYLLA 0.25-35 MG-MCG per tablet; TAKE 1 TABLET BY MOUTH EVERY DAY FOR 84 DAYS  - phenazopyridine (PYRIDIUM) 95 MG tablet; Take 95 mg by mouth. (Patient not taking: Reported on 7/20/2023)  - valACYclovir (VALTREX) 500 MG Tab; Take 500 mg by mouth. (Patient not taking: Reported on 7/20/2023)  Supportive care is reviewed with patient/caregiver - recommend to push PO fluids and electrolytes, nsaids/tylenol, rest, full course of abx, probiotics w/ abx, azo/cran, observe for resolution  Return to clinic with lack of resolution or progression of symptoms.  We will check ultrasound and CBC today.  We will send out urine culture to ensure urinary infection.  We will check vaginal pathogen and GC swabs to ensure no other causes of leukocytes in urine.    I reviewed with patient that despite this work-up it is still possible for her to have an ectopic pregnancy present.  If symptoms persist or become severe she is to be evaluated to the emergency department.  She expresses appropriate understanding.    My total time spent caring for the patient on the day of the  encounter was 45 minutes.   This does not include time spent on separately billable procedures/tests.      I have worn an N95 mask, gloves and eye protection for the entire encounter with this patient.     Differential diagnosis, natural history, supportive care, and indications for immediate follow-up discussed.

## 2023-07-21 ENCOUNTER — HOSPITAL ENCOUNTER (OUTPATIENT)
Dept: LAB | Facility: MEDICAL CENTER | Age: 23
End: 2023-07-21
Attending: PHYSICIAN ASSISTANT
Payer: COMMERCIAL

## 2023-07-21 DIAGNOSIS — R10.2 SUPRAPUBIC DISCOMFORT: ICD-10-CM

## 2023-07-21 DIAGNOSIS — N93.8 DUB (DYSFUNCTIONAL UTERINE BLEEDING): ICD-10-CM

## 2023-07-21 LAB
BASOPHILS # BLD AUTO: 0.2 % (ref 0–1.8)
BASOPHILS # BLD: 0.01 K/UL (ref 0–0.12)
C TRACH DNA GENITAL QL NAA+PROBE: NEGATIVE
EOSINOPHIL # BLD AUTO: 0.11 K/UL (ref 0–0.51)
EOSINOPHIL NFR BLD: 1.7 % (ref 0–6.9)
ERYTHROCYTE [DISTWIDTH] IN BLOOD BY AUTOMATED COUNT: 43.3 FL (ref 35.9–50)
HCT VFR BLD AUTO: 39.3 % (ref 37–47)
HGB BLD-MCNC: 13.1 G/DL (ref 12–16)
IMM GRANULOCYTES # BLD AUTO: 0.01 K/UL (ref 0–0.11)
IMM GRANULOCYTES NFR BLD AUTO: 0.2 % (ref 0–0.9)
LYMPHOCYTES # BLD AUTO: 2 K/UL (ref 1–4.8)
LYMPHOCYTES NFR BLD: 30.4 % (ref 22–41)
MCH RBC QN AUTO: 32.2 PG (ref 27–33)
MCHC RBC AUTO-ENTMCNC: 33.3 G/DL (ref 32.2–35.5)
MCV RBC AUTO: 96.6 FL (ref 81.4–97.8)
MONOCYTES # BLD AUTO: 0.6 K/UL (ref 0–0.85)
MONOCYTES NFR BLD AUTO: 9.1 % (ref 0–13.4)
N GONORRHOEA DNA GENITAL QL NAA+PROBE: NEGATIVE
NEUTROPHILS # BLD AUTO: 3.85 K/UL (ref 1.82–7.42)
NEUTROPHILS NFR BLD: 58.4 % (ref 44–72)
NRBC # BLD AUTO: 0 K/UL
NRBC BLD-RTO: 0 /100 WBC (ref 0–0.2)
PLATELET # BLD AUTO: 262 K/UL (ref 164–446)
PMV BLD AUTO: 12 FL (ref 9–12.9)
RBC # BLD AUTO: 4.07 M/UL (ref 4.2–5.4)
SPECIMEN SOURCE: NORMAL
WBC # BLD AUTO: 6.6 K/UL (ref 4.8–10.8)

## 2023-07-21 PROCEDURE — 85025 COMPLETE CBC W/AUTO DIFF WBC: CPT

## 2023-07-21 PROCEDURE — 36415 COLL VENOUS BLD VENIPUNCTURE: CPT

## 2023-07-27 ENCOUNTER — APPOINTMENT (OUTPATIENT)
Dept: RADIOLOGY | Facility: IMAGING CENTER | Age: 23
End: 2023-07-27
Payer: COMMERCIAL

## 2023-07-27 ENCOUNTER — OFFICE VISIT (OUTPATIENT)
Dept: URGENT CARE | Facility: CLINIC | Age: 23
End: 2023-07-27
Payer: COMMERCIAL

## 2023-07-27 VITALS
HEIGHT: 69 IN | HEART RATE: 68 BPM | SYSTOLIC BLOOD PRESSURE: 122 MMHG | DIASTOLIC BLOOD PRESSURE: 68 MMHG | WEIGHT: 145 LBS | RESPIRATION RATE: 14 BRPM | BODY MASS INDEX: 21.48 KG/M2 | OXYGEN SATURATION: 97 % | TEMPERATURE: 98.4 F

## 2023-07-27 DIAGNOSIS — R10.84 GENERALIZED ABDOMINAL PAIN: ICD-10-CM

## 2023-07-27 DIAGNOSIS — R11.0 NAUSEA: ICD-10-CM

## 2023-07-27 DIAGNOSIS — R10.13 EPIGASTRIC PAIN: ICD-10-CM

## 2023-07-27 DIAGNOSIS — Z76.89 ENCOUNTER TO ESTABLISH CARE WITH NEW DOCTOR: ICD-10-CM

## 2023-07-27 LAB
APPEARANCE UR: NORMAL
BILIRUB UR STRIP-MCNC: NORMAL MG/DL
COLOR UR AUTO: NORMAL
GLUCOSE UR STRIP.AUTO-MCNC: NORMAL MG/DL
KETONES UR STRIP.AUTO-MCNC: 15 MG/DL
LEUKOCYTE ESTERASE UR QL STRIP.AUTO: NORMAL
NITRITE UR QL STRIP.AUTO: NORMAL
PH UR STRIP.AUTO: 5.5 [PH] (ref 5–8)
POCT INT CON NEG: NEGATIVE
POCT INT CON POS: POSITIVE
POCT URINE PREGNANCY TEST: NEGATIVE
PROT UR QL STRIP: NORMAL MG/DL
RBC UR QL AUTO: NORMAL
SP GR UR STRIP.AUTO: 1.01
UROBILINOGEN UR STRIP-MCNC: 0.2 MG/DL

## 2023-07-27 PROCEDURE — 81025 URINE PREGNANCY TEST: CPT

## 2023-07-27 PROCEDURE — 3074F SYST BP LT 130 MM HG: CPT

## 2023-07-27 PROCEDURE — 81002 URINALYSIS NONAUTO W/O SCOPE: CPT

## 2023-07-27 PROCEDURE — 99214 OFFICE O/P EST MOD 30 MIN: CPT

## 2023-07-27 PROCEDURE — 1125F AMNT PAIN NOTED PAIN PRSNT: CPT

## 2023-07-27 PROCEDURE — 3078F DIAST BP <80 MM HG: CPT

## 2023-07-27 ASSESSMENT — PAIN SCALES - GENERAL: PAINLEVEL: 5=MODERATE PAIN

## 2023-07-27 ASSESSMENT — FIBROSIS 4 INDEX: FIB4 SCORE: 0.42

## 2023-07-27 NOTE — PROGRESS NOTES
"Chief Complaint   Patient presents with    Nausea     On going from last visit,     Abdominal Pain     On going from last visit, last day of antibiotics, gets worse in the afternoon, no diarrhea, no fever,          Subjective:   HISTORY OF PRESENT ILLNESS: Melva Garcia is a 23 y.o. female who presents for going abdominal pain.  She is just finishing up a course of antibiotics for BV.  Said her pain is across her upper abdomen and travels to her suprapubic area.  In her prior visit pelvic ultrasound was obtained and was unremarkable, CBC was unremarkable.  Patient does say that she has slight improvement in her pain.  She is consistently nauseous.  She reports soft bowel movement every other day.  Denies any diarrhea, fever, bloody stools.  Pain makes her pain better or worse.  She does say by the end of the day she feels like she can barely sit up due to the discomfort.      Medications, Allergies, current problem list, Social and Family history reviewed today in Epic.     Objective:     /68 (BP Location: Left arm, Patient Position: Sitting, BP Cuff Size: Adult)   Pulse 68   Temp 36.9 °C (98.4 °F) (Temporal)   Resp 14   Ht 1.753 m (5' 9\")   Wt 65.8 kg (145 lb)   SpO2 97%     Physical Exam  Vitals reviewed.   Constitutional:       Appearance: Normal appearance.   HENT:      Mouth/Throat:      Mouth: Mucous membranes are moist.   Cardiovascular:      Rate and Rhythm: Normal rate.   Pulmonary:      Effort: Pulmonary effort is normal.   Abdominal:      General: Abdomen is flat. Bowel sounds are normal.      Palpations: Abdomen is soft.      Tenderness: There is abdominal tenderness in the right upper quadrant, epigastric area, suprapubic area and left upper quadrant. There is no right CVA tenderness, left CVA tenderness, guarding or rebound. Negative signs include Miles's sign and McBurney's sign.      Hernia: No hernia is present.          Comments: Tenderness to the abdomen in a T like " Pt here for NBUVB treatment, tolerating treatments well, no signs of burning. Pt shields face. Stayed the same dose per protocol. distribution.  No guarding, no abdominal masses, no rebound tenderness.  No peritoneal signs.    Skin:     General: Skin is warm and dry.   Neurological:      Mental Status: She is alert and oriented to person, place, and time.   Psychiatric:         Mood and Affect: Mood normal.          Assessment/Plan:     Diagnosis and associated orders    1. Epigastric pain  Referral to establish with [facility]CP      2. Nausea  POCT Urinalysis    POCT Pregnancy    [id number] VIEW    Referral to establish with [facility]CP      3. Generalized abdominal pain  CT-ABDOMEN-PELVIS WITH    Referral to establish with [facility]CP      4. Encounter to establish care with new doctor  Referral to establish with [facility]          I personally reviewed prior external notes and test results pertinent to today's visit.       IMPRESSION: Patient is non-toxic appearing and suitable for outpatient care at this time.   Exam findings reassuring with no evidence of acute abdomen or dehydration requiring emergent evaluation and management.  We will treat with a GI cocktail in clinic and repeat her urinalysis and pregnancy.    Patient immediately threw up GI cocktail, reporting that her abdominal pain got worse.  Her urinalysis again was unremarkable and her pregnancy was negative.  At this time I have ordered a CT abdomen /pelvis to evaluate her symptoms.  She is scheduled at 745 tomorrow morning.  Patient understands that I will call her with her results as soon as they are available.  She does have an appointment with her gynecologist next week.  Educated on red flag symptoms and Instructed patient to return to Urgent Care or nearest Emergency Department if symptoms fail to improve, for any change in condition, further concerns, or new concerning symptoms. Patient states understanding of the plan of care and discharge instructions.    Pt's CT was suggestive of a possible colitis, I have placed a referral for GI and notified the patient.  If she is  not improving before her appt she will go to the ED        Please note that this dictation was created using voice recognition software. I have made a reasonable attempt to correct obvious errors, but I expect that there are errors of grammar and possibly content that I did not discover before finalizing the note.    This note was electronically signed by ANIA Montes

## 2023-07-28 ENCOUNTER — HOSPITAL ENCOUNTER (OUTPATIENT)
Dept: RADIOLOGY | Facility: MEDICAL CENTER | Age: 23
End: 2023-07-28
Payer: COMMERCIAL

## 2023-07-28 DIAGNOSIS — R10.84 GENERALIZED ABDOMINAL PAIN: ICD-10-CM

## 2023-07-28 PROCEDURE — 700117 HCHG RX CONTRAST REV CODE 255

## 2023-07-28 PROCEDURE — 74177 CT ABD & PELVIS W/CONTRAST: CPT

## 2023-07-28 RX ADMIN — IOHEXOL 25 ML: 240 INJECTION, SOLUTION INTRATHECAL; INTRAVASCULAR; INTRAVENOUS; ORAL at 09:11

## 2023-07-28 RX ADMIN — IOHEXOL 100 ML: 350 INJECTION, SOLUTION INTRAVENOUS at 09:12

## 2023-09-07 ENCOUNTER — APPOINTMENT (OUTPATIENT)
Dept: RADIOLOGY | Facility: MEDICAL CENTER | Age: 23
End: 2023-09-07
Attending: EMERGENCY MEDICINE
Payer: COMMERCIAL

## 2023-09-07 ENCOUNTER — HOSPITAL ENCOUNTER (EMERGENCY)
Facility: MEDICAL CENTER | Age: 23
End: 2023-09-07
Attending: EMERGENCY MEDICINE
Payer: COMMERCIAL

## 2023-09-07 VITALS
BODY MASS INDEX: 20.05 KG/M2 | OXYGEN SATURATION: 97 % | HEIGHT: 69 IN | DIASTOLIC BLOOD PRESSURE: 72 MMHG | SYSTOLIC BLOOD PRESSURE: 115 MMHG | RESPIRATION RATE: 16 BRPM | HEART RATE: 63 BPM | WEIGHT: 135.36 LBS | TEMPERATURE: 98 F

## 2023-09-07 DIAGNOSIS — R10.9 ABDOMINAL PAIN, UNSPECIFIED ABDOMINAL LOCATION: ICD-10-CM

## 2023-09-07 LAB
ALBUMIN SERPL BCP-MCNC: 4.2 G/DL (ref 3.2–4.9)
ALBUMIN/GLOB SERPL: 1.3 G/DL
ALP SERPL-CCNC: 61 U/L (ref 30–99)
ALT SERPL-CCNC: 52 U/L (ref 2–50)
ANION GAP SERPL CALC-SCNC: 12 MMOL/L (ref 7–16)
AST SERPL-CCNC: 34 U/L (ref 12–45)
BASOPHILS # BLD AUTO: 0.1 % (ref 0–1.8)
BASOPHILS # BLD: 0.01 K/UL (ref 0–0.12)
BILIRUB SERPL-MCNC: 0.3 MG/DL (ref 0.1–1.5)
BUN SERPL-MCNC: 6 MG/DL (ref 8–22)
CALCIUM ALBUM COR SERPL-MCNC: 8.9 MG/DL (ref 8.5–10.5)
CALCIUM SERPL-MCNC: 9.1 MG/DL (ref 8.5–10.5)
CHLORIDE SERPL-SCNC: 104 MMOL/L (ref 96–112)
CO2 SERPL-SCNC: 22 MMOL/L (ref 20–33)
CREAT SERPL-MCNC: 0.71 MG/DL (ref 0.5–1.4)
EOSINOPHIL # BLD AUTO: 0.06 K/UL (ref 0–0.51)
EOSINOPHIL NFR BLD: 0.9 % (ref 0–6.9)
ERYTHROCYTE [DISTWIDTH] IN BLOOD BY AUTOMATED COUNT: 38.9 FL (ref 35.9–50)
GFR SERPLBLD CREATININE-BSD FMLA CKD-EPI: 121 ML/MIN/1.73 M 2
GLOBULIN SER CALC-MCNC: 3.3 G/DL (ref 1.9–3.5)
GLUCOSE SERPL-MCNC: 90 MG/DL (ref 65–99)
HCG SERPL QL: NEGATIVE
HCT VFR BLD AUTO: 37.2 % (ref 37–47)
HGB BLD-MCNC: 12.8 G/DL (ref 12–16)
IMM GRANULOCYTES # BLD AUTO: 0.02 K/UL (ref 0–0.11)
IMM GRANULOCYTES NFR BLD AUTO: 0.3 % (ref 0–0.9)
LIPASE SERPL-CCNC: 21 U/L (ref 11–82)
LYMPHOCYTES # BLD AUTO: 1.17 K/UL (ref 1–4.8)
LYMPHOCYTES NFR BLD: 16.8 % (ref 22–41)
MCH RBC QN AUTO: 31.9 PG (ref 27–33)
MCHC RBC AUTO-ENTMCNC: 34.4 G/DL (ref 32.2–35.5)
MCV RBC AUTO: 92.8 FL (ref 81.4–97.8)
MONOCYTES # BLD AUTO: 0.47 K/UL (ref 0–0.85)
MONOCYTES NFR BLD AUTO: 6.7 % (ref 0–13.4)
NEUTROPHILS # BLD AUTO: 5.25 K/UL (ref 1.82–7.42)
NEUTROPHILS NFR BLD: 75.2 % (ref 44–72)
NRBC # BLD AUTO: 0 K/UL
NRBC BLD-RTO: 0 /100 WBC (ref 0–0.2)
PLATELET # BLD AUTO: 305 K/UL (ref 164–446)
PMV BLD AUTO: 11.4 FL (ref 9–12.9)
POTASSIUM SERPL-SCNC: 4.2 MMOL/L (ref 3.6–5.5)
PROT SERPL-MCNC: 7.5 G/DL (ref 6–8.2)
RBC # BLD AUTO: 4.01 M/UL (ref 4.2–5.4)
SODIUM SERPL-SCNC: 138 MMOL/L (ref 135–145)
WBC # BLD AUTO: 7 K/UL (ref 4.8–10.8)

## 2023-09-07 PROCEDURE — 84703 CHORIONIC GONADOTROPIN ASSAY: CPT

## 2023-09-07 PROCEDURE — 700102 HCHG RX REV CODE 250 W/ 637 OVERRIDE(OP): Performed by: EMERGENCY MEDICINE

## 2023-09-07 PROCEDURE — 85025 COMPLETE CBC W/AUTO DIFF WBC: CPT

## 2023-09-07 PROCEDURE — 700117 HCHG RX CONTRAST REV CODE 255: Performed by: EMERGENCY MEDICINE

## 2023-09-07 PROCEDURE — 74177 CT ABD & PELVIS W/CONTRAST: CPT

## 2023-09-07 PROCEDURE — A9270 NON-COVERED ITEM OR SERVICE: HCPCS | Performed by: EMERGENCY MEDICINE

## 2023-09-07 PROCEDURE — 83690 ASSAY OF LIPASE: CPT

## 2023-09-07 PROCEDURE — 36415 COLL VENOUS BLD VENIPUNCTURE: CPT

## 2023-09-07 PROCEDURE — 80053 COMPREHEN METABOLIC PANEL: CPT

## 2023-09-07 PROCEDURE — 99284 EMERGENCY DEPT VISIT MOD MDM: CPT

## 2023-09-07 RX ORDER — DICYCLOMINE HCL 20 MG
20 TABLET ORAL EVERY 6 HOURS PRN
Qty: 60 TABLET | Refills: 0 | Status: SHIPPED | OUTPATIENT
Start: 2023-09-07

## 2023-09-07 RX ORDER — DICYCLOMINE HCL 20 MG
20 TABLET ORAL ONCE
Status: COMPLETED | OUTPATIENT
Start: 2023-09-07 | End: 2023-09-07

## 2023-09-07 RX ADMIN — IOHEXOL 80 ML: 350 INJECTION, SOLUTION INTRAVENOUS at 13:00

## 2023-09-07 RX ADMIN — DICYCLOMINE HYDROCHLORIDE 20 MG: 20 TABLET ORAL at 11:56

## 2023-09-07 ASSESSMENT — PAIN DESCRIPTION - PAIN TYPE: TYPE: ACUTE PAIN

## 2023-09-07 ASSESSMENT — FIBROSIS 4 INDEX: FIB4 SCORE: 0.42

## 2023-09-07 NOTE — ED TRIAGE NOTES
Chief Complaint   Patient presents with    Abdominal Pain     x2 months; worse x 1.5 weeks; pt recently had CT done and was told it was possibly Crohn's Disease    Nausea/Vomiting/Diarrhea     x2 months; worse x1.5 months    Fatigue     x2 months; getting progressively worse     Pt ambulatory to triage for above complaint. Pt states she has not been officially diagnosed with Crohn's Disease yet but was told by her doctor to come to the ER if her pain got worse, which it has.     Pt is alert/oriented and follows commands. Pt speaking in full sentences and responds appropriately to questions. No acute distress noted in triage and respirations are even and unlabored.     Pt placed in lobby and educated on triage process. Pt encouraged to alert staff for any changes in condition.

## 2023-09-07 NOTE — ED NOTES
Fatigue, nausea and diarrhea x1.5 months,  Vomiting x2 days. Pt denies blood in both vomit and stool.    Pt states 8/10 pain to lower bilateral abdomen x1.5 months.     Pt changed into gown and placed on appropriate monitors. Call light within reach.     ERP at bedside.

## 2023-09-07 NOTE — ED PROVIDER NOTES
ED Provider Note    Scribed for Junie Miles M.D. by Margarita Winter. 9/7/2023, 11:20 AM.    Primary care provider: Pcp Pt States None  Means of arrival: Walk in  History obtained from: Patient  History limited by: None    CHIEF COMPLAINT  Chief Complaint   Patient presents with    Abdominal Pain     x2 months; worse x 1.5 weeks; pt recently had CT done and was told it was possibly Crohn's Disease    Nausea/Vomiting/Diarrhea     x2 months; worse x1.5 months    Fatigue     x2 months; getting progressively worse       HPI/ROS  Melva Garcia is a 23 y.o. female who presents to the Emergency Department for worsening constant abdominal pain onset two months ago.  She describes her pain as a generalized moderate abdominal cramping.  Patient was initially seen at urgent care for her symptoms approximately 2 months ago and had a CT which showed colitis, she was advised that this was likely inflammatory colitis and she needed follow-up with gastroenterology.  She did follow-up with gastroenterology and has a colonoscopy scheduled in a couple of weeks for further evaluation.  She reports experiencing nausea, , diarrhea mixed with constipation, all of which she noted that has been getting worse. Denies any chest pain, burning with urination blood in her vomit or stool. The patient describes that her diarrhea is green and watery. She has tried different diets in hopes of symptom alleviation but had none. She notes some alleviation with eating pineapple. The patient followed up with GI who scheduled her for a colonoscopy in September. She adds that she smokes 1/8 g a week of mariajuana and used to drink every day but stopped when her symptoms started.     EXTERNAL RECORDS REVIEWED  Hospital records review show that she had an Urgent Care visit for abdominal pain pain twice in July. She was seen on 7/20/23 where she was treated for bacterial vaginosis. The patient was tested for chlamydia and gonorrhea which were  "negative. Vaginal pathogens panel was positive for vaginosis. Second visit on 7/27/23 and a CT scan was done which showed thickening of the transverse and sigmoid coon consistent with colitis. The patient was told maybe her symptoms were related to Crohn's disease and was advised to follow up with GI.     LIMITATION TO HISTORY   Select: : None    OUTSIDE HISTORIAN(S):  None      PAST MEDICAL HISTORY   has a past medical history of Anxiety.    SURGICAL HISTORY   has a past surgical history that includes lap,diagnostic abdomen (Right, 1/5/2022) and salpingectomy (Right, 1/5/2022).    SOCIAL HISTORY  Social History     Tobacco Use    Smoking status: Never    Smokeless tobacco: Never   Vaping Use    Vaping Use: Never used   Substance Use Topics    Alcohol use: Yes     Comment: occcasional - drinks 2-3 days per week sometimes less; none x2 months    Drug use: Yes     Types: Marijuana, Inhaled     Comment: 2 times a day Marijuana      Social History     Substance and Sexual Activity   Drug Use Yes    Types: Marijuana, Inhaled    Comment: 2 times a day Marijuana       FAMILY HISTORY  Family History   Problem Relation Age of Onset    Psychiatric Illness Mother         Borderline personality disorder       CURRENT MEDICATIONS  Home Medications       Reviewed by Angelica Garrido R.N. (Registered Nurse) on 09/07/23 at 1028  Med List Status: Not Addressed     Medication Last Dose Status   ESTARYLLA 0.25-35 MG-MCG per tablet  Active   valacyclovir (VALTREX) 1 GM Tab  Active                    ALLERGIES  Allergies   Allergen Reactions    Other Food Swelling     Truffles, pt reports that her throat swelled up       Pcn [Penicillins]      Pt reports that it was a childhood allergie       PHYSICAL EXAM  VITAL SIGNS: /85   Pulse 88   Temp 36.2 °C (97.1 °F) (Temporal)   Resp 16   Ht 1.753 m (5' 9\")   Wt 61.4 kg (135 lb 5.8 oz)   LMP 08/23/2023 (Approximate)   SpO2 100%   BMI 19.99 kg/m²   Vitals reviewed by " myself.  Physical Exam  Nursing note and vitals reviewed.  Constitutional: Well-developed and well-nourished. No acute distress.   HENT: Head is normocephalic and atraumatic. No ulcers in the mouth, No mucositis.   Eyes: extra-ocular movements intact  Cardiovascular: Regular rate and regular rhythm. No murmur heard.  Pulmonary/Chest: Breath sounds normal. No wheezes or rales.   Abdominal: Soft and non-tender. No distention, Bowel sounds are normal  Musculoskeletal: Extremities exhibit normal range of motion without edema or tenderness.   Neurological: Awake and alert  Skin: Skin is warm and dry. No rash.        DIAGNOSTIC STUDIES:  LABS  Labs Reviewed   CBC WITH DIFFERENTIAL - Abnormal; Notable for the following components:       Result Value    RBC 4.01 (*)     Neutrophils-Polys 75.20 (*)     Lymphocytes 16.80 (*)     All other components within normal limits   COMP METABOLIC PANEL - Abnormal; Notable for the following components:    Bun 6 (*)     ALT(SGPT) 52 (*)     All other components within normal limits   LIPASE   HCG QUAL SERUM   ESTIMATED GFR   URINALYSIS,CULTURE IF INDICATED       All labs reviewed and independently interpreted by myself    RADIOLOGY  Final interpretation by radiology demonstrates:    CT-ABDOMEN-PELVIS WITH   Final Result      No acute inflammation in the abdomen or pelvis.        The radiologist's interpretation of all radiological studies have been reviewed by me.          COURSE & MEDICAL DECISION MAKING    ED Observation Status? Yes; I am placing the patient in to an observation status due to a diagnostic uncertainty as well as therapeutic intensity. Patient placed in observation status at 11:28 AM, 9/7/2023.     Observation plan is as follows: Monitor for symptom management and diagnostic results     Upon Reevaluation, the patient's condition has: Improved; and will be discharged.    Patient discharged from ED Observation status at 1:16 PM  (Time) 9/7/2023  (Date).     INITIAL  ASSESSMENT AND PLAN    Patient is a 23-year-old female who comes in for evaluation of abdominal cramping and diarrhea.  Differential diagnosis includes inflammatory bowel disease, colitis, infectious colitis, diverticulitis.  Diagnostic work-up includes labs and CT.       REASSESSMENTS   11:28 AM - Patient seen and examined at bedside.     11:35 AM - Discussed plan of care with resident.     11:40 AM - Patient was reevaluated at bedside. Discussed plan of care including CT scan and medication for symptom alleviation. Patient agrees to the plan of care.     1:14 PM - Patient was reevaluated at bedside. Discussed lab and radiology results with the patient and informed the patient of the plan for discharge with medication for symptom management and continue appointments with the GI doctor.      ER COURSE AND FINAL DISPOSITION   Patient's initial vitals are within normal limits and she is well-appearing on exam.  Patient is treated with Bentyl after which she feels greatly improved.  Labs returned and are unremarkable, she has no leukocytosis.  CT returns and demonstrates no acute findings.  As she has had ongoing symptoms for a few months and she has no leukocytosis I believe this is likely inflammatory bowel disease versus celiac's versus food allergy.  She will require further diagnostic testing with gastroenterologist.  I advised her to follow-up as scheduled.  As she had relief with Bentyl I will prescribe for this for management of symptoms at home.  She is then given strict return precautions and discharged in stable condition.    The patient will return for new or worsening symptoms and is stable at the time of discharge.    The patient is referred to a primary physician for blood pressure management, diabetic screening, and for all other preventative health concerns.      DISPOSITION:  Patient will be discharged home in stable condition.    FOLLOW UP:  [facility]  [address]  [place] [address]  [phone]          OUTPATIENT MEDICATIONS:  New Prescriptions    DICYCLOMINE (BENTYL) 20 MG TAB    Take 1 Tablet by mouth every 6 hours as needed (abdominal pain/cramping).        ADDITIONAL PROBLEM LIST AND RESOURCE UTILIZATION    Additional problems aside from the chief complaint that I have addressed: None    I have discussed management of the patient with the following physicians and SOLANGE's: None    Discussion of management with other QHP or appropriate source(s): None     Escalation of care considered, and ultimately not performed: see above.     Barriers to care at this time, including but not limited to: Patient does not have established PCP.     Decision tools and prescription drugs considered including, but not limited to: see above.    Please see review of records as noted above      FINAL IMPRESSION  1. Abdominal pain, unspecified abdominal location          Margarita ANNE (Scribe), am scribing for, and in the presence of, Junie Miles M.D..    Electronically signed by: Margarita Winter (Scribe), 9/7/2023    Junie ANNE M.D. personally performed the services described in this documentation, as scribed by Margarita Winter in my presence, and it is both accurate and complete.    The note accurately reflects work and decisions made by me.  Junie Miles M.D.  9/7/2023  5:08 PM

## 2023-09-07 NOTE — ED NOTES
PIV access obtained. Pt updated on POC and need for urine sample. Pt denies further needs at this time. Call light within reach. VSS.

## 2023-09-20 ENCOUNTER — HOSPITAL ENCOUNTER (OUTPATIENT)
Facility: MEDICAL CENTER | Age: 23
End: 2023-09-20
Attending: INTERNAL MEDICINE
Payer: COMMERCIAL

## 2023-09-20 PROCEDURE — 83993 ASSAY FOR CALPROTECTIN FECAL: CPT

## 2023-09-22 LAB — CALPROTECTIN STL-MCNT: 13 UG/G

## 2024-09-05 ENCOUNTER — APPOINTMENT (OUTPATIENT)
Dept: URGENT CARE | Facility: CLINIC | Age: 24
End: 2024-09-05

## 2024-10-22 ENCOUNTER — OFFICE VISIT (OUTPATIENT)
Dept: URGENT CARE | Facility: CLINIC | Age: 24
End: 2024-10-22
Payer: COMMERCIAL

## 2024-10-22 VITALS
DIASTOLIC BLOOD PRESSURE: 88 MMHG | WEIGHT: 158 LBS | HEIGHT: 69 IN | OXYGEN SATURATION: 98 % | BODY MASS INDEX: 23.4 KG/M2 | SYSTOLIC BLOOD PRESSURE: 120 MMHG | TEMPERATURE: 98.4 F | RESPIRATION RATE: 16 BRPM | HEART RATE: 88 BPM

## 2024-10-22 DIAGNOSIS — R51.9 NONINTRACTABLE HEADACHE, UNSPECIFIED CHRONICITY PATTERN, UNSPECIFIED HEADACHE TYPE: ICD-10-CM

## 2024-10-22 LAB
APPEARANCE UR: CLEAR
BILIRUB UR STRIP-MCNC: NORMAL MG/DL
COLOR UR AUTO: YELLOW
FLUAV RNA SPEC QL NAA+PROBE: NEGATIVE
FLUBV RNA SPEC QL NAA+PROBE: NEGATIVE
GLUCOSE UR STRIP.AUTO-MCNC: NORMAL MG/DL
KETONES UR STRIP.AUTO-MCNC: NORMAL MG/DL
LEUKOCYTE ESTERASE UR QL STRIP.AUTO: NORMAL
NITRITE UR QL STRIP.AUTO: NORMAL
PH UR STRIP.AUTO: 5.5 [PH] (ref 5–8)
POCT INT CON NEG: NEGATIVE
POCT INT CON POS: POSITIVE
POCT URINE PREGNANCY TEST: NEGATIVE
PROT UR QL STRIP: NORMAL MG/DL
RBC UR QL AUTO: NORMAL
RSV RNA SPEC QL NAA+PROBE: NEGATIVE
SARS-COV-2 RNA RESP QL NAA+PROBE: NEGATIVE
SP GR UR STRIP.AUTO: 1.01
UROBILINOGEN UR STRIP-MCNC: 0.2 MG/DL

## 2024-10-22 PROCEDURE — 99214 OFFICE O/P EST MOD 30 MIN: CPT

## 2024-10-22 PROCEDURE — 3079F DIAST BP 80-89 MM HG: CPT

## 2024-10-22 PROCEDURE — 81025 URINE PREGNANCY TEST: CPT

## 2024-10-22 PROCEDURE — 0241U POCT CEPHEID COV-2, FLU A/B, RSV - PCR: CPT

## 2024-10-22 PROCEDURE — 3074F SYST BP LT 130 MM HG: CPT

## 2024-10-22 PROCEDURE — 81002 URINALYSIS NONAUTO W/O SCOPE: CPT

## 2024-10-22 RX ORDER — KETOROLAC TROMETHAMINE 15 MG/ML
15 INJECTION, SOLUTION INTRAMUSCULAR; INTRAVENOUS ONCE
Status: COMPLETED | OUTPATIENT
Start: 2024-10-22 | End: 2024-10-22

## 2024-10-22 RX ORDER — ONDANSETRON 4 MG/1
4 TABLET, ORALLY DISINTEGRATING ORAL ONCE
Status: COMPLETED | OUTPATIENT
Start: 2024-10-22 | End: 2024-10-22

## 2024-10-22 RX ORDER — SUMATRIPTAN 6 MG/.5ML
6 INJECTION, SOLUTION SUBCUTANEOUS ONCE
Status: COMPLETED | OUTPATIENT
Start: 2024-10-22 | End: 2024-10-22

## 2024-10-22 RX ADMIN — SUMATRIPTAN 6 MG: 6 INJECTION, SOLUTION SUBCUTANEOUS at 15:19

## 2024-10-22 RX ADMIN — ONDANSETRON 4 MG: 4 TABLET, ORALLY DISINTEGRATING ORAL at 14:42

## 2024-10-22 RX ADMIN — KETOROLAC TROMETHAMINE 15 MG: 15 INJECTION, SOLUTION INTRAMUSCULAR; INTRAVENOUS at 14:42

## 2024-10-22 ASSESSMENT — FIBROSIS 4 INDEX: FIB4 SCORE: 0.4

## 2024-10-23 ENCOUNTER — HOSPITAL ENCOUNTER (EMERGENCY)
Facility: MEDICAL CENTER | Age: 24
End: 2024-10-23
Attending: EMERGENCY MEDICINE
Payer: COMMERCIAL

## 2024-10-23 VITALS
WEIGHT: 159.61 LBS | OXYGEN SATURATION: 97 % | BODY MASS INDEX: 23.64 KG/M2 | DIASTOLIC BLOOD PRESSURE: 85 MMHG | TEMPERATURE: 97.6 F | RESPIRATION RATE: 16 BRPM | HEIGHT: 69 IN | SYSTOLIC BLOOD PRESSURE: 131 MMHG | HEART RATE: 71 BPM

## 2024-10-23 DIAGNOSIS — G43.109 MIGRAINE WITH AURA AND WITHOUT STATUS MIGRAINOSUS, NOT INTRACTABLE: ICD-10-CM

## 2024-10-23 PROCEDURE — 96375 TX/PRO/DX INJ NEW DRUG ADDON: CPT

## 2024-10-23 PROCEDURE — 700111 HCHG RX REV CODE 636 W/ 250 OVERRIDE (IP): Mod: JZ | Performed by: EMERGENCY MEDICINE

## 2024-10-23 PROCEDURE — 96374 THER/PROPH/DIAG INJ IV PUSH: CPT

## 2024-10-23 PROCEDURE — 99284 EMERGENCY DEPT VISIT MOD MDM: CPT

## 2024-10-23 RX ORDER — ONDANSETRON 4 MG/1
4 TABLET, ORALLY DISINTEGRATING ORAL EVERY 6 HOURS PRN
Qty: 10 TABLET | Refills: 0 | Status: SHIPPED | OUTPATIENT
Start: 2024-10-23

## 2024-10-23 RX ORDER — DIPHENHYDRAMINE HYDROCHLORIDE 50 MG/ML
50 INJECTION INTRAMUSCULAR; INTRAVENOUS ONCE
Status: COMPLETED | OUTPATIENT
Start: 2024-10-23 | End: 2024-10-23

## 2024-10-23 RX ORDER — PROCHLORPERAZINE EDISYLATE 5 MG/ML
10 INJECTION INTRAMUSCULAR; INTRAVENOUS ONCE
Status: COMPLETED | OUTPATIENT
Start: 2024-10-23 | End: 2024-10-23

## 2024-10-23 RX ORDER — KETOROLAC TROMETHAMINE 15 MG/ML
15 INJECTION, SOLUTION INTRAMUSCULAR; INTRAVENOUS ONCE
Status: COMPLETED | OUTPATIENT
Start: 2024-10-23 | End: 2024-10-23

## 2024-10-23 RX ORDER — KETOROLAC TROMETHAMINE 10 MG/1
10 TABLET, FILM COATED ORAL 3 TIMES DAILY PRN
Qty: 15 TABLET | Refills: 0 | Status: SHIPPED | OUTPATIENT
Start: 2024-10-23

## 2024-10-23 RX ADMIN — KETOROLAC TROMETHAMINE 15 MG: 15 INJECTION, SOLUTION INTRAMUSCULAR; INTRAVENOUS at 21:21

## 2024-10-23 RX ADMIN — PROCHLORPERAZINE EDISYLATE 10 MG: 5 INJECTION INTRAMUSCULAR; INTRAVENOUS at 21:21

## 2024-10-23 RX ADMIN — DIPHENHYDRAMINE HYDROCHLORIDE 50 MG: 50 INJECTION, SOLUTION INTRAMUSCULAR; INTRAVENOUS at 21:21

## 2024-10-23 ASSESSMENT — PAIN DESCRIPTION - PAIN TYPE: TYPE: ACUTE PAIN

## 2024-10-23 ASSESSMENT — FIBROSIS 4 INDEX: FIB4 SCORE: 0.4

## (undated) DEVICE — LIGASURE LAPAROSCOPIC 5MM - (6EA/CA)

## (undated) DEVICE — SUTURE 3-0 MONOCRYL PLUS PS-2 - (12/BX)

## (undated) DEVICE — CANNULA W/SEAL 5X100 Z-THRE - ADED KII (12/BX)

## (undated) DEVICE — CANISTER SUCTION 3000ML MECHANICAL FILTER AUTO SHUTOFF MEDI-VAC NONSTERILE LF DISP  (40EA/CA)

## (undated) DEVICE — SUTURE GENERAL

## (undated) DEVICE — TUBING CLEARLINK DUO-VENT - C-FLO (48EA/CA)

## (undated) DEVICE — TRAY SRGPRP PVP IOD WT PRP - (20/CA)

## (undated) DEVICE — KIT ANESTHESIA W/CIRCUIT & 3/LT BAG W/FILTER (20EA/CA)

## (undated) DEVICE — DRESSING TRANSPARENT FILM TEGADERM 4 X 4.75" (50EA/BX)"

## (undated) DEVICE — SLEEVE VASO CALF MED - (10PR/CA)

## (undated) DEVICE — NEEDLE INSFL 120MM 14GA VRRS - (20/BX)

## (undated) DEVICE — PROTECTOR ULNA NERVE - (36PR/CA)

## (undated) DEVICE — TOWEL STOP TIMEOUT SAFETY FLAG (40EA/CA)

## (undated) DEVICE — GLOVE BIOGEL PI INDICATOR SZ 6.5 SURGICAL PF LF - (50/BX 4BX/CA)

## (undated) DEVICE — GLOVE BIOGEL PI INDICATOR SZ 7.5 SURGICAL PF LF -(50/BX 4BX/CA)

## (undated) DEVICE — HEAD HOLDER JUNIOR/ADULT

## (undated) DEVICE — GLOVE BIOGEL SZ 6.5 SURGICAL PF LTX (50PR/BX 4BX/CA)

## (undated) DEVICE — TROCAR 5X100 NON BLADED Z-TH - READ KII (6/BX)

## (undated) DEVICE — PAD SANITARY 11IN MAXI IND WRAPPED  (12EA/PK 24PK/CA)

## (undated) DEVICE — TUBE E-T HI-LO CUFF 6.5MM (10EA/BX)

## (undated) DEVICE — PACK LAPAROSCOPY - (1/CA)

## (undated) DEVICE — SET EXTENSION WITH 2 PORTS (48EA/CA) ***PART #2C8610 IS A SUBSTITUTE*****

## (undated) DEVICE — ELECTRODE DUAL RETURN W/ CORD - (50/PK)

## (undated) DEVICE — DRESSING TRANSPARENT FILM TEGADERM 2.375 X 2.75"  (100EA/BX)"

## (undated) DEVICE — SET TUBING PNEUMOCLEAR HIGH FLOW SMOKE EVACUATION (10EA/BX)

## (undated) DEVICE — JELLY SURGILUBE STERILE TUBE 4.25 OZ (1/EA)

## (undated) DEVICE — LACTATED RINGERS INJ 1000 ML - (14EA/CA 60CA/PF)

## (undated) DEVICE — SET SUCTION/IRRIGATION WITH DISPOSABLE TIP (6/CA )PART #0250-070-520 IS A SUB

## (undated) DEVICE — BAG RETRIEVAL 5MM (10EA/BX)

## (undated) DEVICE — NEPTUNE 4 PORT MANIFOLD - (20/PK)

## (undated) DEVICE — GLOVE BIOGEL SZ 7 SURGICAL PF LTX - (50PR/BX 4BX/CA)

## (undated) DEVICE — GOWN WARMING STANDARD FLEX - (30/CA)

## (undated) DEVICE — CHLORAPREP 26 ML APPLICATOR - ORANGE TINT(25/CA)

## (undated) DEVICE — ELECTRODE 850 FOAM ADHESIVE - HYDROGEL RADIOTRNSPRNT (50/PK)

## (undated) DEVICE — SET LEADWIRE 5 LEAD BEDSIDE DISPOSABLE ECG (1SET OF 5/EA)

## (undated) DEVICE — SODIUM CHL IRRIGATION 0.9% 1000ML (12EA/CA)

## (undated) DEVICE — SENSOR SPO2 NEO LNCS ADHESIVE (20/BX) SEE USER NOTES

## (undated) DEVICE — MASK ANESTHESIA ADULT  - (100/CA)

## (undated) DEVICE — SUCTION INSTRUMENT YANKAUER BULBOUS TIP W/O VENT (50EA/CA)